# Patient Record
Sex: FEMALE | Race: WHITE | NOT HISPANIC OR LATINO | Employment: FULL TIME | URBAN - METROPOLITAN AREA
[De-identification: names, ages, dates, MRNs, and addresses within clinical notes are randomized per-mention and may not be internally consistent; named-entity substitution may affect disease eponyms.]

---

## 2017-01-28 ENCOUNTER — HOSPITAL ENCOUNTER (OUTPATIENT)
Dept: RADIOLOGY | Facility: CLINIC | Age: 22
Discharge: HOME | End: 2017-01-28
Admitting: EMERGENCY MEDICINE
Payer: COMMERCIAL

## 2017-01-28 ENCOUNTER — OFFICE VISIT (OUTPATIENT)
Dept: URGENT CARE | Facility: CLINIC | Age: 22
End: 2017-01-28
Payer: COMMERCIAL

## 2017-01-28 DIAGNOSIS — M25.572 PAIN IN LEFT ANKLE: ICD-10-CM

## 2017-01-28 PROCEDURE — 73610 X-RAY EXAM OF ANKLE: CPT

## 2017-01-28 PROCEDURE — 99214 OFFICE O/P EST MOD 30 MIN: CPT

## 2018-01-25 ENCOUNTER — OFFICE VISIT (OUTPATIENT)
Dept: URGENT CARE | Facility: CLINIC | Age: 23
End: 2018-01-25
Payer: COMMERCIAL

## 2018-01-25 VITALS
SYSTOLIC BLOOD PRESSURE: 102 MMHG | DIASTOLIC BLOOD PRESSURE: 70 MMHG | OXYGEN SATURATION: 99 % | TEMPERATURE: 98.6 F | HEIGHT: 66 IN | BODY MASS INDEX: 36.96 KG/M2 | WEIGHT: 230 LBS | RESPIRATION RATE: 18 BRPM | HEART RATE: 104 BPM

## 2018-01-25 DIAGNOSIS — R10.11 RIGHT UPPER QUADRANT PAIN: Primary | ICD-10-CM

## 2018-01-25 DIAGNOSIS — R19.7 DIARRHEA, UNSPECIFIED TYPE: ICD-10-CM

## 2018-01-25 PROCEDURE — 99213 OFFICE O/P EST LOW 20 MIN: CPT | Performed by: PHYSICIAN ASSISTANT

## 2018-01-25 NOTE — PROGRESS NOTES
Assessment/Plan:      Diagnoses and all orders for this visit:    Left upper quadrant pain    Diarrhea, unspecified type          Subjective:     Patient ID: Marleni Holguin is a 23 y o  female  66-year-old female here with complaint of nausea and vomiting that started 4 days ago  Has felt very nauseous over the last 4 days  Also has diarrhea for the last 4 days  Vomiting occurred on day 1 only  Patient is now having some right upper quadrant abdominal pain  Denies any association to any type of food  Is able to hold down only small sips of water at a time  Has not been able to eat anything over the last 4 days  Feels dizzy and lightheaded at times  He has felt feverish and has had chills  Review of Systems   Constitutional: Positive for chills and fever  Negative for activity change, appetite change, diaphoresis, fatigue and unexpected weight change  HENT: Negative for congestion, dental problem, hearing loss, sinus pressure, sneezing, sore throat, tinnitus, trouble swallowing and voice change  Eyes: Negative for photophobia, redness and visual disturbance  Respiratory: Negative for apnea, cough, chest tightness, shortness of breath, wheezing and stridor  Cardiovascular: Negative for chest pain, palpitations and leg swelling  Gastrointestinal: Positive for abdominal pain, diarrhea, nausea and vomiting  Negative for abdominal distention, blood in stool and constipation  Endocrine: Negative for cold intolerance, heat intolerance, polydipsia, polyphagia and polyuria  Genitourinary: Negative for difficulty urinating, dysuria, flank pain, frequency, hematuria and urgency  Musculoskeletal: Negative for arthralgias, back pain, gait problem, joint swelling, myalgias, neck pain and neck stiffness  Skin: Negative for pallor, rash and wound  Neurological: Negative for dizziness, tremors, seizures, speech difficulty, weakness and headaches  Hematological: Negative for adenopathy  Does not bruise/bleed easily  Psychiatric/Behavioral: Negative for agitation, confusion, dysphoric mood and sleep disturbance  The patient is not nervous/anxious  All other systems reviewed and are negative  Objective:     Physical Exam   Constitutional: She appears well-developed and well-nourished  No distress  HENT:   Head: Normocephalic  Right Ear: External ear normal    Left Ear: External ear normal    Nose: Nose normal    Mouth/Throat: Oropharynx is clear and moist  No oropharyngeal exudate  Neck: Normal range of motion  Neck supple  Cardiovascular: Normal rate, regular rhythm and normal heart sounds  No murmur heard  Pulmonary/Chest: Effort normal and breath sounds normal  No respiratory distress  She has no wheezes  She has no rales  Abdominal: Bowel sounds are increased  There is tenderness  There is positive Auguste's sign  There is no rigidity and no guarding  Musculoskeletal: Normal range of motion  Lymphadenopathy:     She has no cervical adenopathy  Skin: Skin is warm  No rash noted

## 2020-07-05 ENCOUNTER — HOSPITAL ENCOUNTER (EMERGENCY)
Facility: HOSPITAL | Age: 25
Discharge: HOME | End: 2020-07-05
Attending: EMERGENCY MEDICINE | Admitting: EMERGENCY MEDICINE
Payer: COMMERCIAL

## 2020-07-05 VITALS
SYSTOLIC BLOOD PRESSURE: 178 MMHG | RESPIRATION RATE: 20 BRPM | DIASTOLIC BLOOD PRESSURE: 110 MMHG | WEIGHT: 250 LBS | HEIGHT: 66 IN | HEART RATE: 91 BPM | BODY MASS INDEX: 40.18 KG/M2 | TEMPERATURE: 97.6 F | OXYGEN SATURATION: 97 %
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
## 2020-07-05 PROCEDURE — 99281 EMR DPT VST MAYX REQ PHY/QHP: CPT

## 2020-07-05 PROCEDURE — 99284 EMERGENCY DEPT VISIT MOD MDM: CPT | Performed by: PHYSICIAN ASSISTANT

## 2020-07-05 RX ORDER — DOXYCYCLINE HYCLATE 100 MG/1
100 CAPSULE ORAL 2 TIMES DAILY
Qty: 14 CAPSULE | Refills: 0 | Status: SHIPPED | OUTPATIENT
Start: 2020-07-05 | End: 2020-07-12

## 2020-07-05 NOTE — ED PROVIDER NOTES
History  Chief Complaint   Patient presents with   [place] Thursday night in her yard, took OTC allergy pill  Patient presents with a red, swollen right ankle  She states that while sitting outside on her porch on Thursday, she felt a bite on her ankle  She is unsure what bit her  She denies any mechanical injury to the ankle  She states that her ankle was itchy and swollen the following day, but didn't become red until today  She does admit to scratching at the affected pruritic area  She used ice and took one Allegra on Saturday which she states helped the swelling  She denies fevers, chills, sweats, nausea, vomiting, and diarrhea  No lower leg pain or swelling  No personal or family history of DVT/PE  No specific a DVT/PE risk factors  None       Past Medical History:   Diagnosis Date    Gestational diabetes     Hypothyroid     Psoriatic arthritis ([facility] [address] )        Past Surgical History:   Procedure Laterality Date    RHINOPLASTY         History reviewed  No pertinent family history  I have reviewed and agree with the history as documented  E-Cigarette/Vaping     E-Cigarette/Vaping Substances     Social History     Tobacco Use    Smoking status: Never Smoker    Smokeless tobacco: Never Used   Substance Use Topics    Alcohol use: Not on file    Drug use: Not on file       Review of Systems   Constitutional: Negative for chills, fatigue and fever  HENT: Negative for ear pain and sore throat  Eyes: Negative for pain  Respiratory: Negative for cough, shortness of breath and wheezing  Cardiovascular: Negative for chest pain, palpitations and leg swelling  Gastrointestinal: Negative for abdominal pain, constipation, diarrhea, nausea and vomiting  Endocrine: Negative for polyuria  Genitourinary: Negative for dysuria and pelvic pain  Musculoskeletal: Positive for joint swelling  Negative for myalgias, neck pain and neck stiffness  Skin: Negative for rash  Neurological: Negative for dizziness, syncope, light-headedness and headaches  All other systems reviewed and are negative  Physical Exam  Physical Exam   Constitutional: She is oriented to person, place, and time  She appears well-developed and well-nourished  HENT:   Head: Normocephalic and atraumatic  Mouth/Throat: No oropharyngeal exudate  Eyes: EOM are normal    Neck: Normal range of motion  Cardiovascular: Normal rate, regular rhythm and normal heart sounds  Pulmonary/Chest: Effort normal and breath sounds normal    Abdominal: Soft  Bowel sounds are normal  There is no tenderness  Musculoskeletal: Normal range of motion  Moderate nonpitting edema to right ankle  Minimal increased erythema with no significant increased warmth  Small abrasions overlying anterior lateral surface of ankle joint without fluctuance or significant tenderness  No obvious bite marks or rash   Neurological: She is alert and oriented to person, place, and time  Skin: Skin is warm  Capillary refill takes less than 2 seconds  Psychiatric: She has a normal mood and affect  Vital Signs  ED Triage Vitals [07/05/20 1621]   Temperature Pulse Respirations Blood Pressure SpO2   97 6 °F (36 4 °C) 91 20 (!) 178/110 97 %      Temp Source Heart Rate Source Patient Position - Orthostatic VS BP Location FiO2 (%)   Temporal -- Sitting Left arm --      Pain Score       5           Vitals:    07/05/20 1621   BP: (!) 178/110   Pulse: 91   Patient Position - Orthostatic VS: Sitting         Visual Acuity      ED Medications  Medications - No data to display    Diagnostic Studies  Results Reviewed     None                 No orders to display              Procedures  Procedures         ED Course       US AUDIT      Most Recent Value   Initial Alcohol Screen: US AUDIT-C    1  How often do you have a drink containing alcohol? 3 Filed at: 07/05/2020 0143   2   How many drinks containing alcohol do you have on a typical day you are drinking? 0 Filed at: 07/05/2020 1623   3a  Male UNDER 65: How often do you have five or more drinks on one occasion? 0 Filed at: 07/05/2020 1623   3b  FEMALE Any Age, or MALE 65+: How often do you have 4 or more drinks on one occassion? 0 Filed at: 07/05/2020 1623   Audit-C Score  3 Filed at: 07/05/2020 1623                  ANDREA/DAST-10      Most Recent Value   How many times in the past year have you    Used an illegal drug or used a prescription medication for non-medical reasons? Never Filed at: 07/05/2020 1623                                MDM  Number of Diagnoses or Management Options  Bug bite with infection, initial encounter:   Diagnosis management comments: Patient presented with a swelling to her right ankle after she believes she was bit by a bug  It did not appear obviously infected however it did get considerably swollen and uncomfortable for the patient  This is likely a localized soft tissue infection that is starting  Apply Ace wrap to the affected joint and recommended warm compresses  Prescribe doxycycline and utilize shared decision making with the patient to quit on possibly starting medication right away however it is my recommendation for her to only start taking it and if it does not improve or get significantly worse the last   Risks of doxycycline discussed with patient and she was understanding of plan  Her elevated blood pressure noted  Patient states she gets nervous in the ER  Recommended follow-up with family doctor  Disposition  Final diagnoses:   Bug bite with infection, initial encounter     Time reflects when diagnosis was documented in both MDM as applicable and the Disposition within this note     Time User Action Codes Description Comment    7/5/2020  5:10 PM Marley Banda Add Rola Pfeiffer  XXXA] Bug bite with infection, initial encounter       ED Disposition     ED Disposition Condition Date/Time Comment    Discharge Stable Sun Jul 5, 2020  5:09 PM Serafina Moritz [address] [facility] discharge to home/self care  Follow-up Information     Follow up With Specialties Details Why [address] [place],  Nephrology Schedule an appointment as soon as possible for a visit   [address] TENA Reed  [phone]            Discharge Medication List as of 7/5/2020  5:17 PM      START taking these medications    Details   doxycycline hyclate (VIBRAMYCIN) 100 mg capsule Take 1 capsule (100 mg total) by mouth 2 (two) times a day for 7 days, Starting Sun 7/5/2020, Until Sun 7/12/2020, Normal           No discharge procedures on file      PDMP Review     None          ED Provider  Electronically Signed by           Burgess Mai PA-C  07/05/20 0659

## 2020-07-05 NOTE — DISCHARGE INSTRUCTIONS
Apply warm compresses to affected area  Keep on ace wrap as needed for pain and swelling   Start taking doxycycline if condition does not improve and follow up with a family doctor or return to ER if you develop any fever or chills

## 2022-05-24 ENCOUNTER — OFFICE VISIT (OUTPATIENT)
Dept: FAMILY MEDICINE CLINIC | Facility: CLINIC | Age: 27
End: 2022-05-24
Payer: COMMERCIAL

## 2022-05-24 VITALS
TEMPERATURE: 98.7 F | OXYGEN SATURATION: 98 % | DIASTOLIC BLOOD PRESSURE: 84 MMHG | WEIGHT: 281 LBS | HEIGHT: 66 IN | BODY MASS INDEX: 45.16 KG/M2 | SYSTOLIC BLOOD PRESSURE: 126 MMHG | HEART RATE: 88 BPM

## 2022-05-24 DIAGNOSIS — Z76.89 ENCOUNTER TO ESTABLISH CARE: ICD-10-CM

## 2022-05-24 DIAGNOSIS — Z86.32 HISTORY OF GESTATIONAL DIABETES: ICD-10-CM

## 2022-05-24 DIAGNOSIS — Z00.00 ANNUAL PHYSICAL EXAM: Primary | ICD-10-CM

## 2022-05-24 DIAGNOSIS — E03.8 HYPOTHYROIDISM DUE TO HASHIMOTO'S THYROIDITIS: ICD-10-CM

## 2022-05-24 DIAGNOSIS — E06.3 HYPOTHYROIDISM DUE TO HASHIMOTO'S THYROIDITIS: ICD-10-CM

## 2022-05-24 DIAGNOSIS — Z87.59 HISTORY OF MISCARRIAGE: ICD-10-CM

## 2022-05-24 DIAGNOSIS — F41.9 ANXIETY: ICD-10-CM

## 2022-05-24 PROBLEM — Z82.79 FAMILY HISTORY OF COMPLEX CONGENITAL HEART DISEASE: Status: ACTIVE | Noted: 2019-08-12

## 2022-05-24 PROBLEM — Z82.49 FAMILY HISTORY OF COMPLEX CONGENITAL HEART DISEASE: Status: ACTIVE | Noted: 2019-08-12

## 2022-05-24 PROCEDURE — 99385 PREV VISIT NEW AGE 18-39: CPT | Performed by: FAMILY MEDICINE

## 2022-05-24 PROCEDURE — 99204 OFFICE O/P NEW MOD 45 MIN: CPT | Performed by: FAMILY MEDICINE

## 2022-05-24 PROCEDURE — 3008F BODY MASS INDEX DOCD: CPT | Performed by: FAMILY MEDICINE

## 2022-05-24 PROCEDURE — 3725F SCREEN DEPRESSION PERFORMED: CPT | Performed by: FAMILY MEDICINE

## 2022-05-24 PROCEDURE — 1036F TOBACCO NON-USER: CPT | Performed by: FAMILY MEDICINE

## 2022-05-24 RX ORDER — BUSPIRONE HYDROCHLORIDE 7.5 MG/1
7.5 TABLET ORAL 2 TIMES DAILY
Qty: 60 TABLET | Refills: 5 | Status: SHIPPED | OUTPATIENT
Start: 2022-05-24 | End: 2022-06-28 | Stop reason: SDUPTHER

## 2022-05-24 RX ORDER — LEVOTHYROXINE SODIUM 0.03 MG/1
25 TABLET ORAL
Qty: 90 TABLET | Refills: 0 | Status: SHIPPED | OUTPATIENT
Start: 2022-05-24 | End: 2022-06-28 | Stop reason: SDUPTHER

## 2022-05-24 NOTE — PROGRESS NOTES
ADULT ANNUAL [facility]    NAME: Annie Saldana  AGE: 32 y o  SEX: female  : 1995     DATE: 2022     Assessment and Plan:     Problem List Items Addressed This Visit        Endocrine    Hypothyroidism due to Hashimoto's thyroiditis    Relevant Medications    levothyroxine (Euthyrox) 25 mcg tablet    Other Relevant Orders    CBC and differential    TSH, 3rd generation with Free T4 reflex       Other    History of miscarriage    History of gestational diabetes    Relevant Orders    Lipid Panel with Direct LDL reflex    Basic metabolic panel    BMI 54 2-75 2, adult ([address] )    Relevant Orders    Ambulatory Referral to Weight Management    Anxiety    Relevant Medications    busPIRone (BUSPAR) 7 5 mg tablet      Other Visit Diagnoses     Annual physical exam    -  Primary    Encounter to establish care              Immunizations and preventive care screenings were discussed with patient today  Appropriate education was printed on patient's after visit summary  Counseling:  Alcohol/drug use: discussed moderation in alcohol intake, the recommendations for healthy alcohol use, and avoidance of illicit drug use  Dental Health: discussed importance of regular tooth brushing, flossing, and dental visits  Injury prevention: discussed safety/seat belts, safety helmets, smoke detectors, carbon dioxide detectors, and smoking near bedding or upholstery  Sexual health: discussed sexually transmitted diseases, partner selection, use of condoms, avoidance of unintended pregnancy, and contraceptive alternatives  · Exercise: the importance of regular exercise/physical activity was discussed  Recommend exercise 3-5 times per week for at least 30 minutes  BMI Counseling: Body mass index is 45 35 kg/m²   The BMI is above normal  Nutrition recommendations include decreasing portion sizes, encouraging healthy choices of fruits and vegetables, decreasing fast food intake, consuming healthier snacks, moderation in carbohydrate intake, increasing intake of lean protein and reducing intake of saturated and trans fat  Exercise recommendations include moderate physical activity 150 minutes/week and exercising 3-5 times per week  Rationale for BMI follow-up plan is due to patient being overweight or obese  Depression Screening and Follow-up Plan: Patient was screened for depression during today's encounter  They screened negative with a PHQ-2 score of 0  No follow-ups on file  Chief Complaint:     Chief Complaint   Patient presents with   [facility] Annual Exam     Patient mentions she had miscarriage last month  Has thyroid issues, would like to lose weight and mentions anxiety  History of Present Illness:     Adult Annual Physical   Patient here for a comprehensive physical exam  The patient reports  - see problem-focused  Diet and Physical Activity  · Diet/Nutrition: well balanced diet, frequent junk food and consuming 3-5 servings of fruits/vegetables daily  · Exercise: no formal exercise  Depression Screening  PHQ-2/9 Depression Screening    Little interest or pleasure in doing things: 0 - not at all  Feeling down, depressed, or hopeless: 0 - not at all  PHQ-2 Score: 0  PHQ-2 Interpretation: Negative depression screen       General Health  · Sleep: sleeps well  · Hearing: normal - bilateral   · Vision: most recent eye exam >1 year ago and wears contacts  · Dental: regular dental visits  /GYN Health  · Follows with GYN     Review of Systems:     Review of Systems  - see problem-focused      Past Medical History:     Past Medical History:   Diagnosis Date    Gestational diabetes     Hypothyroid     Psoriatic arthritis ([facility] [address] )       Past Surgical History:     Past Surgical History:   Procedure Laterality Date    RHINOPLASTY        Social History:     Social History     Socioeconomic History    Marital status: Single     Spouse name: None    Number of children: None    Years of education: None    Highest education level: None   Occupational History    None   Tobacco Use    Smoking status: Never Smoker    Smokeless tobacco: Never Used   Substance and Sexual Activity    Alcohol use: None    Drug use: None    Sexual activity: None   Other Topics Concern    None   Social History Narrative    None     Social Determinants of Health     Financial Resource Strain: Not on file   Food Insecurity: Not on file   Transportation Needs: Not on file   Physical Activity: Not on file   Stress: Not on file   Social Connections: Not on file   Intimate Partner Violence: Not on file   Housing Stability: Not on file      Family History:     No family history on file  Current Medications:     Current Outpatient Medications   Medication Sig Dispense Refill    busPIRone (BUSPAR) 7 5 mg tablet Take 1 tablet (7 5 mg total) by mouth in the morning and 1 tablet (7 5 mg total) in the evening  60 tablet 5    levothyroxine (Euthyrox) 25 mcg tablet Take 1 tablet (25 mcg total) by mouth daily in the early morning 90 tablet 0    Loratadine (CLARITIN PO) Take by mouth       No current facility-administered medications for this visit  Allergies:     No Known Allergies   Physical Exam:     /84   Pulse 88   Temp 98 7 °F (37 1 °C)   Ht 5' 6" (1 676 m)   Wt 127 kg (281 lb)   SpO2 98%   BMI 45 35 kg/m²     Physical Exam - see problem-focused       [facility]

## 2022-05-24 NOTE — PROGRESS NOTES
Feli Russell 1995 female MRN: [id number]  [address]    Visit to Establish Care: Family Medicine    Assessment/Plan     No problem-specific Assessment & Plan notes found for this encounter  Flaca Eid was seen today for establish care and annual exam     Diagnoses and all orders for this visit:    Annual physical exam    History of miscarriage    Hypothyroidism due to Hashimoto's thyroiditis  -     CBC and differential; Future  -     TSH, 3rd generation with Free T4 reflex; Future  -     levothyroxine (Euthyrox) 25 mcg tablet; Take 1 tablet (25 mcg total) by mouth daily in the early morning    History of gestational diabetes  -     Lipid Panel with Direct LDL reflex; Future  -     Basic metabolic panel; Future    BMI 45 0-49 9, adult [facility])  -     Ambulatory Referral to Weight Management; Future    Anxiety  -     busPIRone (BUSPAR) 7 5 mg tablet; Take 1 tablet (7 5 mg total) by mouth in the morning and 1 tablet (7 5 mg total) in the evening  Encounter to establish care        In addition to the above, the patient was counseled on general preventative health care subjects, including but not limited to:  - Nutrition, healthy weight, aerobic and weight-bearing exercise  - Mental health, social support, and self care  - Advised of the importance of dental hygiene and routine dental visits   - Patient made aware of  services at the office  SUBJECTIVE    HPI:  Feli Russell is a 32 y o  female who presented to establish care with this family medicine practice  Two prior pregnancies, first miscarriage in April, now had period  Follows with OBGYN  Has 3year old daughter  Thyroid disorder- Diagnosed in high school, on synthroid for a couple years, then in college dropped off  Recent thyroid ultrasound normal  Free T4 normal 0 76, fasting glucose normal 89, CMP normal, TSH 7 8  Blood work from April  History of gestational diabetes   She has struggled with obesity her whole life, weak points are carbs and sweet, usually lunch sandwich or pizza, dinner chicken and vegetables  Irregular work schedule  Not exercising currently  No history of anorexia or not eating  Binges sometimes, with irregular work schedule eats when she has time to eat  Anxiety- Past year or two, anxious about many things, worries about daughter and   No depression or loss of interest, worries about something happening to her family  Sometimes money concern  Good family support  No SI  No prior treatment  PHQ-2/9 Depression Screening    Little interest or pleasure in doing things: 0 - not at all  Feeling down, depressed, or hopeless: 0 - not at all  PHQ-2 Score: 0  PHQ-2 Interpretation: Negative depression screen         Family history of diabetes, breast cancer  Review of Systems   All other systems reviewed and are negative  Historical Information   Past Medical History:   Diagnosis Date    Gestational diabetes     Hypothyroid     [facility] ([facility] [address] )      Past Surgical History:   Procedure Laterality Date    RHINOPLASTY       No family history on file    Social History     Socioeconomic History    Marital status: Single     Spouse name: Not on file    Number of children: Not on file    Years of education: Not on file    Highest education level: Not on file   Occupational History    Not on file   Tobacco Use    Smoking status: Never Smoker    Smokeless tobacco: Never Used   Substance and Sexual Activity    Alcohol use: Not on file    Drug use: Not on file    Sexual activity: Not on file   Other Topics Concern    Not on file   Social History Narrative    Not on file     Social Determinants of Health     Financial Resource Strain: Not on file   Food Insecurity: Not on file   Transportation Needs: Not on file   Physical Activity: Not on file   Stress: Not on file   Social Connections: Not on file   Intimate Partner Violence: Not on file   Housing Stability: Not on file     OB History    No obstetric history on file  Medications:      Current Outpatient Medications:     busPIRone (BUSPAR) 7 5 mg tablet, Take 1 tablet (7 5 mg total) by mouth in the morning and 1 tablet (7 5 mg total) in the evening , Disp: 60 tablet, Rfl: 5    levothyroxine (Euthyrox) 25 mcg tablet, Take 1 tablet (25 mcg total) by mouth daily in the early morning, Disp: 90 tablet, Rfl: 0    Loratadine (CLARITIN PO), Take by mouth, Disp: , Rfl:       Physical Exam:    Physical Exam  Vitals reviewed  Constitutional:       General: She is not in acute distress  Appearance: Normal appearance  She is not ill-appearing, toxic-appearing or diaphoretic  HENT:      Head: Normocephalic and atraumatic  Eyes:      General:         Right eye: No discharge  Left eye: No discharge  Extraocular Movements: Extraocular movements intact  Conjunctiva/sclera: Conjunctivae normal    Cardiovascular:      Rate and Rhythm: Normal rate and regular rhythm  Heart sounds: Normal heart sounds  No murmur heard  No friction rub  No gallop  Pulmonary:      Effort: Pulmonary effort is normal  No respiratory distress  Breath sounds: Normal breath sounds  No stridor  No wheezing or rhonchi  Musculoskeletal:         General: No swelling, tenderness or signs of injury  Right lower leg: No edema  Left lower leg: No edema  Skin:     General: Skin is warm  Coloration: Skin is not pale  Findings: No erythema or rash  Neurological:      Mental Status: She is alert and oriented to person, place, and time  Motor: No weakness  Psychiatric:         Mood and Affect: Mood normal          Behavior: Behavior normal             No future appointments        Perry Cash DO  [address] Primary Care

## 2022-05-24 NOTE — PATIENT INSTRUCTIONS

## 2022-06-28 ENCOUNTER — TELEPHONE (OUTPATIENT)
Dept: ADMINISTRATIVE | Facility: OTHER | Age: 27
End: 2022-06-28

## 2022-06-28 ENCOUNTER — OFFICE VISIT (OUTPATIENT)
Dept: FAMILY MEDICINE CLINIC | Facility: CLINIC | Age: 27
End: 2022-06-28
Payer: COMMERCIAL

## 2022-06-28 VITALS
TEMPERATURE: 99.1 F | OXYGEN SATURATION: 99 % | WEIGHT: 285 LBS | RESPIRATION RATE: 18 BRPM | SYSTOLIC BLOOD PRESSURE: 122 MMHG | HEART RATE: 84 BPM | DIASTOLIC BLOOD PRESSURE: 78 MMHG | BODY MASS INDEX: 45.8 KG/M2 | HEIGHT: 66 IN

## 2022-06-28 DIAGNOSIS — E03.8 HYPOTHYROIDISM DUE TO HASHIMOTO'S THYROIDITIS: Primary | ICD-10-CM
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
## 2022-06-28 DIAGNOSIS — E06.3 HYPOTHYROIDISM DUE TO HASHIMOTO'S THYROIDITIS: Primary | ICD-10-CM

## 2022-06-28 DIAGNOSIS — F41.9 ANXIETY: ICD-10-CM

## 2022-06-28 PROCEDURE — 3725F SCREEN DEPRESSION PERFORMED: CPT | Performed by: FAMILY MEDICINE

## 2022-06-28 PROCEDURE — 3008F BODY MASS INDEX DOCD: CPT | Performed by: FAMILY MEDICINE

## 2022-06-28 PROCEDURE — 99214 OFFICE O/P EST MOD 30 MIN: CPT | Performed by: FAMILY MEDICINE

## 2022-06-28 PROCEDURE — 1036F TOBACCO NON-USER: CPT | Performed by: FAMILY MEDICINE

## 2022-06-28 RX ORDER — LEVOTHYROXINE SODIUM 0.05 MG/1
50 TABLET ORAL
Qty: 90 TABLET | Refills: 1 | Status: SHIPPED | OUTPATIENT
Start: 2022-06-28

## 2022-06-28 RX ORDER — BUSPIRONE HYDROCHLORIDE 10 MG/1
10 TABLET ORAL 2 TIMES DAILY
Qty: 60 TABLET | Refills: 1 | Status: SHIPPED | OUTPATIENT
Start: 2022-06-28

## 2022-06-28 NOTE — TELEPHONE ENCOUNTER
----- Message from Catia Sanchez sent at 6/28/2022  6:43 AM EDT -----  Regarding: Cervical Cancer  06/28/22 6:43 AM    Hello, our patient Liv Galloway has had Pap Smear (HPV) aka Cervical Cancer Screening completed/performed  Please assist in updating the patient chart by pulling the Care Everywhere (CE) document  The date of service is 7/29/19       Thank you,  Catia Patel

## 2022-06-28 NOTE — ASSESSMENT & PLAN NOTE
- No erythema or fevers, recommended to apply antibiotic cream twice daily and call if persistent/worsening, will call in antibiotic

## 2022-06-28 NOTE — ASSESSMENT & PLAN NOTE
- Increase levothyroxine 50 mcg daily, TSH in 6 weeks   - Advised will continue to monitor periods, hopefully should improve with thyroid medication dosage adjustment

## 2022-06-28 NOTE — PROGRESS NOTES
Assessment/Plan:       Problem List Items Addressed This Visit        Endocrine    Hypothyroidism due to Hashimoto's thyroiditis - Primary     - Increase levothyroxine 50 mcg daily, TSH in 6 weeks   - Advised will continue to monitor periods, hopefully should improve with thyroid medication dosage adjustment            Relevant Medications    levothyroxine 50 mcg tablet    Other Relevant Orders    TSH, 3rd generation       Other    BMI 45 0-49 9, adult (HCC)    Anxiety    Relevant Medications    busPIRone (BUSPAR) 10 mg tablet    Bug bite     - No erythema or fevers, recommended to apply antibiotic cream twice daily and call if persistent/worsening, will call in antibiotic                    Subjective:      Patient ID: Priyanka Barajas is a 32 y o  female  HPI     Recent blood work reviewed and normal, except for as outlined below:     Hypothyroidism- Free T4 normal, TSH 4 73, restarted levothyroxine 25 mcg daily 4 weeks ago  Feels fatigued, sluggish, no constipation or cold intolerance  Otherwise blood work normal      Anxiety- Last visit we started Buspar 7 5 mg BID  Gets worked up sometimes/anxiety flares, but overall better  Anxiety more manageable  No depression  Has gained a couple pounds with this, unsure if medication  Has follow-up with weight management in July  Period concern- LMP started 6/18, has now lasted much longer 10 days, usually period every 30 days, no history of irregular periods, no birth control  Follows with GYN  Bug bite on arm, weekend of 6/18, unsure what bite her  No fevers, comes to head/pus sometimes  Not healing  The following portions of the patient's history were reviewed and updated as appropriate: allergies, current medications, past family history, past medical history, past social history, past surgical history, and problem list     Review of Systems   All other systems reviewed and are negative          Objective:      /78   Pulse 84   Temp 99 1 °F (37 3 °C) (Tympanic)   Resp 18   Ht 5' 6" (1 676 m)   Wt 129 kg (285 lb)   SpO2 99%   BMI 46 00 kg/m²          Physical Exam  Vitals reviewed  Constitutional:       General: She is not in acute distress  Appearance: Normal appearance  She is not ill-appearing, toxic-appearing or diaphoretic  HENT:      Head: Normocephalic and atraumatic  Eyes:      General:         Right eye: No discharge  Left eye: No discharge  Extraocular Movements: Extraocular movements intact  Conjunctiva/sclera: Conjunctivae normal    Cardiovascular:      Rate and Rhythm: Normal rate and regular rhythm  Heart sounds: Normal heart sounds  No murmur heard  No friction rub  No gallop  Pulmonary:      Effort: Pulmonary effort is normal  No respiratory distress  Breath sounds: Normal breath sounds  No stridor  No wheezing or rhonchi  Musculoskeletal:         General: No swelling, tenderness or signs of injury  Right lower leg: No edema  Left lower leg: No edema  Skin:     General: Skin is warm  Coloration: Skin is not pale  Findings: No erythema or rash  Comments: 2-3 mm circular excoriation/bite latrice, no surrounding erythema, no discharge left forearm    Neurological:      Mental Status: She is alert and oriented to person, place, and time  Motor: No weakness     Psychiatric:         Mood and Affect: Mood normal          Behavior: Behavior normal              Dena Loss, DO  [facility]

## 2022-06-29 NOTE — TELEPHONE ENCOUNTER
Upon review of the In Basket request we were able to locate, review, and update the patient chart as requested for Pap Smear (HPV) aka Cervical Cancer Screening  Any additional questions or concerns should be emailed to the Practice Liaisons via [email or web address] email, please do not reply via In Basket      Thank you  Vibha Wells

## 2022-07-14 ENCOUNTER — OFFICE VISIT (OUTPATIENT)
Dept: BARIATRICS | Facility: CLINIC | Age: 27
End: 2022-07-14

## 2022-07-14 DIAGNOSIS — R63.5 ABNORMAL WEIGHT GAIN: ICD-10-CM

## 2022-07-14 PROCEDURE — WMDI30

## 2022-07-14 PROCEDURE — RECHECK

## 2022-07-14 NOTE — PROGRESS NOTES
Weight Management Medical Nutrition Assessment  Chang Green was here today for medical meal planning and was referred by her doctor  Today she weighs 285 lbs and has a goal weihgt of 160 - 170 lbs  She admits that she does not pay attention to portion sizes, and on days off, can have 6 - 8 cans of beer  Recommend cutting back on the beer,and increase her water  She had seen an RD before for gestational DM and is familiar with carb counting and logging  Reviewed my fitness pal, her carb needs, and portion sizes  Patient seen by Medical Provider in past 6 months:  yes  Requested to schedule appointment with Medical Provider: No      Anthropometric Measurements  Start Weight (#): 285 0  Current Weight (#): 285 0  TBW % Change from start weight:0%  Ideal Body Weight (#):130  Goal Weight (#): 160 - 170    Weight Loss History  Previous weight loss attempts: Commercial Programs (Weight Watchers, [employer], etc )  Nutrition Counseling with RD    Food and Nutrition Related History  Wake up: 6:30 - 7am   Bed Time:    Food Recall  Breakfast: eggs or whatever is htere    Snack:bar  Lunch:sandwich with salad  Snack:  Dinner: meat, starch, veg  Snack:grazes      Beverages: water and sugar free beverages, social drinking (beer (6-8 2 days per week)  Volume of beverage intake: 68 - 70    Weekends: Same  Cravings: sweets  Trouble area of day: night time    Frequency of Eating out: irregularly  Food restrictions:none  Cooking: self   Food Shopping: self    Physical Activity Intake  Activity:none  Frequency:infrequently  Physical limitations/barriers to exercise: none    Estimated Needs  Energy    [employer] Needs: BMR : 2034 1-2# loss weekly sedentary:  1441 - 1941             1-2# loss weekly lightly active: 1527 - 1927  Maintenance calories for sedentary activity level: 2441  Protein:67 - 84    (1 2-1 5g/kg IBW)  Fluid: 66    (35mL/kg IBW)    Nutrition Diagnosis  Yes;     Overweight/obesity  related to Excess energy intake as evidenced by  BMI more than normative standard for age and sex (obesity-grade III 36+)       Nutrition Intervention    Nutrition Prescription  Calories:1300 - 1500  Protein: 70 - 80  Fluid70      Nutrition Education:    Calorie controlled menu  Lean protein food choices  Healthy snack options  Food journaling tips      Nutrition Counseling:  Strategies: meal planning, portion sizes, healthy snack choices, hydration, fiber intake, protein intake, exercise, food journal      Monitoring and Evaluation:  Evaluation criteria:  Energy Intake  Meet protein needs  Maintain adequate hydration  Monitor weekly weight  Meal planning/preparation  Food journal   Decreased portions at mealtimes and snacks  Physical activity     Barriers to learning:none  Readiness to change: Action:  (Changing behavior)  Comprehension: good  Expected Compliance: good

## 2022-07-25 ENCOUNTER — PATIENT MESSAGE (OUTPATIENT)
Dept: FAMILY MEDICINE CLINIC | Facility: CLINIC | Age: 27
End: 2022-07-25

## 2022-08-01 ENCOUNTER — OFFICE VISIT (OUTPATIENT)
Dept: URGENT CARE | Facility: CLINIC | Age: 27
End: 2022-08-01
Payer: COMMERCIAL

## 2022-08-01 VITALS
DIASTOLIC BLOOD PRESSURE: 78 MMHG | TEMPERATURE: 98 F | RESPIRATION RATE: 18 BRPM | OXYGEN SATURATION: 99 % | BODY MASS INDEX: 45.64 KG/M2 | WEIGHT: 284 LBS | HEART RATE: 84 BPM | SYSTOLIC BLOOD PRESSURE: 124 MMHG | HEIGHT: 66 IN

## 2022-08-01 DIAGNOSIS — L30.9 DERMATITIS: Primary | ICD-10-CM

## 2022-08-01 PROCEDURE — 99213 OFFICE O/P EST LOW 20 MIN: CPT | Performed by: NURSE PRACTITIONER

## 2022-08-01 RX ORDER — TRIAMCINOLONE ACETONIDE 0.25 MG/G
CREAM TOPICAL 2 TIMES DAILY
Qty: 30 G | Refills: 0 | Status: SHIPPED | OUTPATIENT
Start: 2022-08-01

## 2022-08-01 NOTE — PROGRESS NOTES
[employer] [facility]        NAME: Arnel Martinez is a 32 y o  female  : 1995    MRN: [id number]  DATE: 2022  TIME: 12:45 PM    Assessment and Plan   Dermatitis [L30 9]  1  Dermatitis  triamcinolone (KENALOG) 0 025 % cream         Patient Instructions       Follow up with PCP in 3-5 days  Proceed to  ER if symptoms worsen  You have been prescribed a topical cream for the rash  Use very sparingly to the face and neck  You may take claritin daily for itching  Follow up with your PCP and OBGYN  Go to the ED if symptoms worsen        Chief Complaint     Chief Complaint   Patient presents with    Rash         History of Present Illness       This is a 32year old female who is 7 weeks pregnant and states developed a rash this morning  She denies any new meds, jewelry, foods, soaps, detergents  She states she was using hydrocortisone cream w/o relief  She states it is very itchy  She states it is on her face/cheeks and left neck  She has a hx of eczema but states it doesn't feel like it  She states she attempted to get in touch with her OB but was unable  Review of Systems   Review of Systems   Constitutional: Negative  HENT: Negative  Eyes: Negative  Respiratory: Negative  Cardiovascular: Negative  Gastrointestinal: Negative  Endocrine: Negative  Genitourinary: Negative  Musculoskeletal: Negative  Skin: Positive for rash  Allergic/Immunologic: Negative  Neurological: Negative  Hematological: Negative  Psychiatric/Behavioral: Negative            Current Medications       Current Outpatient Medications:     triamcinolone (KENALOG) 0 025 % cream, Apply topically 2 (two) times a day Apply sparingly, Disp: 30 g, Rfl: 0    busPIRone (BUSPAR) 10 mg tablet, Take 1 tablet (10 mg total) by mouth 2 (two) times a day, Disp: 60 tablet, Rfl: 1    levothyroxine 50 mcg tablet, Take 1 tablet (50 mcg total) by mouth daily in the early morning, Disp: 90 tablet, Rfl: 1    Loratadine (CLARITIN PO), Take by mouth, Disp: , Rfl:     Current Allergies     Allergies as of 08/01/2022    (No Known Allergies)            The following portions of the patient's history were reviewed and updated as appropriate: allergies, current medications, past family history, past medical history, past social history, past surgical history and problem list      Past Medical History:   Diagnosis Date    Gestational diabetes     Hypothyroid     Psoriatic arthritis ([address] )        Past Surgical History:   Procedure Laterality Date    RHINOPLASTY         History reviewed  No pertinent family history  Medications have been verified  Objective   /78   Pulse 84   Temp 98 °F (36 7 °C)   Resp 18   Ht 5' 6" (1 676 m)   Wt 129 kg (284 lb)   SpO2 99%   BMI 45 84 kg/m²   No LMP recorded  Physical Exam     Physical Exam  Vitals and nursing note reviewed  Constitutional:       General: She is not in acute distress  Appearance: Normal appearance  She is obese  She is not ill-appearing, toxic-appearing or diaphoretic  HENT:      Head: Normocephalic and atraumatic  Right Ear: Tympanic membrane and ear canal normal       Left Ear: Tympanic membrane and ear canal normal       Nose: Nose normal  No congestion or rhinorrhea  Mouth/Throat:      Mouth: Mucous membranes are moist       Pharynx: Oropharynx is clear  No oropharyngeal exudate or posterior oropharyngeal erythema  Eyes:      Extraocular Movements: Extraocular movements intact  Cardiovascular:      Rate and Rhythm: Normal rate and regular rhythm  Pulses: Normal pulses  Heart sounds: Normal heart sounds  Pulmonary:      Effort: Pulmonary effort is normal       Breath sounds: Normal breath sounds  Abdominal:      General: There is no distension  Palpations: Abdomen is soft  Tenderness: There is no abdominal tenderness  Musculoskeletal:         General: Normal range of motion  Cervical back: Normal range of motion and neck supple  Skin:     General: Skin is warm and dry  Capillary Refill: Capillary refill takes less than 2 seconds  Findings: Erythema and rash present  Neurological:      General: No focal deficit present  Mental Status: She is alert and oriented to person, place, and time  Psychiatric:         Mood and Affect: Mood normal          Behavior: Behavior normal          Thought Content:  Thought content normal          Judgment: Judgment normal

## 2022-08-01 NOTE — PATIENT INSTRUCTIONS
You have been prescribed a topical cream for the rash  Use very sparingly to the face and neck  You may take claritin daily for itching     Follow up with your PCP and OBGYN  Go to the ED if symptoms worsen

## 2022-08-06 ENCOUNTER — AMB VIDEO VISIT (OUTPATIENT)
Dept: OTHER | Facility: HOSPITAL | Age: 27
End: 2022-08-06

## 2022-08-06 DIAGNOSIS — L40.9 PSORIASIS: Primary | ICD-10-CM

## 2022-08-06 PROCEDURE — ECARE PR SL URGENT CARE VIRTUAL VISIT: Performed by: PHYSICIAN ASSISTANT

## 2022-08-06 NOTE — PATIENT INSTRUCTIONS
Psoriasis   WHAT YOU NEED TO KNOW:   Psoriasis is a long-term skin disease in which the skin cells grow faster than normal  This abnormal growth causes a buildup of cells on the surface of the skin  Red, raised patches of skin that are covered with silver-colored scales form on your skin  DISCHARGE INSTRUCTIONS:   Medicines:   Topical medicine: These medicines can be ointments, creams, and pastes and are applied on the skin  Moisturizers: These soothe your skin by keeping it moist and preventing dryness  Steroids: This medicine may be given to decrease inflammation  Vitamin D and retinoids: These are vitamin-based creams that are used to clear plaques  Anthralin:  This medicine decreases swelling and excess skin cells that form scales  Salicylic acid: This peeling agent helps decrease scaling of the skin and scalp  Tar preparations: These medicines decrease your itching, scaling, and inflammation  They may be shampoos, creams, or bath oils  Oral medicine: These medicines are used to treat serious types of psoriasis and are taken by mouth  These medicines include steroids or retinoids  They may also include medicines that decrease the rate of growth of your skin cells or that affect your immune system  Take your medicine as directed  Contact your healthcare provider if you think your medicine is not helping or if you have side effects  Tell him of her if you are allergic to any medicine  Keep a list of the medicines, vitamins, and herbs you take  Include the amounts, and when and why you take them  Bring the list or the pill bottles to follow-up visits  Carry your medicine list with you in case of an emergency  Follow up with your healthcare provider or dermatologist as directed:  Write down your questions so you remember to ask them during your visits  Self-care: Take care of your skin:  Apply emollients, lubricants, or moisturizing creams to your skin regularly   Apply these while your skin is still damp when you bathe  Stop using them if they sting or irritate your skin  Use mild soaps and add bath oils to soothe your skin when you bathe  Protect your skin from sun exposure:  When you get sun exposure for short periods of time, it can help your psoriasis  Too much sun exposure or a sunburn can make your psoriasis worse  Talk to your dermatologist or healthcare provider about how much sun exposure is right for you  Manage stress:  Stress can trigger a flare-up  Find healthy ways to manage stress, such as deep breathing or meditation  Watch for symptoms with new medicines or herbal supplements:  Some medicines, including herbal supplements, may trigger a psoriasis flare-up  Ask if any of the medicines you take may be making your psoriasis worse  Always check for skin changes when you take your medicines  Do not smoke:  Smoking can trigger a flare-up of psoriasis  If you smoke, it is never too late to quit  Ask for information about how to stop  Avoid triggers:  Injury to the skin, cold weather, and heavy alcohol use are other things that can trigger psoriasis flare-ups  Contact your healthcare provider if:   You get pregnant  You have a fever  Your skin plaques are not getting better or are getting worse  You cannot sleep because your skin itches  Your skin plaques have pus draining from them or they have soft yellow scabs  You have questions or concerns about your condition or care  Return to the emergency department if:   Psoriasis suddenly covers larger areas of your body and becomes more painful  © Copyright [employer] 2022 Information is for End User's use only and may not be sold, redistributed or otherwise used for commercial purposes  All illustrations and images included in CareNotes® are the copyrighted property of A D A M , Inc  or Stephane Reed  The above information is an  only   It is not intended as medical advice for individual conditions or treatments  Talk to your doctor, nurse or pharmacist before following any medical regimen to see if it is safe and effective for you

## 2022-08-06 NOTE — PROGRESS NOTES
Video Visit - Krista Uriarte 32 y o  female MRN: [id number]    REQUIRED DOCUMENTATION:         1  This service was provided via [employer]  2  Provider located at [address] [place] [phone]  3  [facility] provider: Teddy Carballo PA-C   4  Identify all parties in room with patient during [facility] visit:  No one else  5  After connecting through [email or web address], patient was identified by name and date of birth  Patient was then informed that this was a Telemedicine visit and that the exam was being conducted confidentially over secure lines  My office door was closed  No one else was in the room  Patient acknowledged consent and understanding of privacy and security of the Telemedicine visit  I informed the patient that I have reviewed their record in Epic and presented the opportunity for them to ask any questions regarding the visit today  The patient agreed to participate  HPI  Patient has a rash on her neck  She has psoriasis but it has blown up so bad  It is under her eyes and neck  She went to urgent care this week  She was given a steroid cream  She was worried about using steroids while pregnant  She states the rash is getting worse  She has not been able to see her OBGYN yet   Physical Exam  Constitutional:       General: She is not in acute distress  Appearance: Normal appearance  She is not ill-appearing, toxic-appearing or diaphoretic  HENT:      Head: Normocephalic and atraumatic  Pulmonary:      Effort: Pulmonary effort is normal  No respiratory distress  Skin:     General: Skin is dry  Findings: Rash present  Comments: Erythematous rash on neck and bilateral cheeks    Neurological:      General: No focal deficit present  Mental Status: She is alert     Psychiatric:         Mood and Affect: Mood normal          Behavior: Behavior normal          Diagnoses and all orders for this visit:    Psoriasis    start the topical kenalog cream as directed by urgent care  You may call you remy GARCIA to confirm they are ok with the cream if concerned  Follow up with RADHA Monday  ER if worsen   Patient Instructions   Psoriasis   WHAT YOU NEED TO KNOW:   Psoriasis is a long-term skin disease in which the skin cells grow faster than normal  This abnormal growth causes a buildup of cells on the surface of the skin  Red, raised patches of skin that are covered with silver-colored scales form on your skin  DISCHARGE INSTRUCTIONS:   Medicines:   1  Topical medicine: These medicines can be ointments, creams, and pastes and are applied on the skin  ? Moisturizers: These soothe your skin by keeping it moist and preventing dryness  ? Steroids: This medicine may be given to decrease inflammation  ? Vitamin D and retinoids: These are vitamin-based creams that are used to clear plaques  ? Anthralin:  This medicine decreases swelling and excess skin cells that form scales  ? Salicylic acid: This peeling agent helps decrease scaling of the skin and scalp  ? Tar preparations: These medicines decrease your itching, scaling, and inflammation  They may be shampoos, creams, or bath oils  2  Oral medicine: These medicines are used to treat serious types of psoriasis and are taken by mouth  These medicines include steroids or retinoids  They may also include medicines that decrease the rate of growth of your skin cells or that affect your immune system  3  Take your medicine as directed  Contact your healthcare provider if you think your medicine is not helping or if you have side effects  Tell him of her if you are allergic to any medicine  Keep a list of the medicines, vitamins, and herbs you take  Include the amounts, and when and why you take them  Bring the list or the pill bottles to follow-up visits  Carry your medicine list with you in case of an emergency      Follow up with your healthcare provider or dermatologist as directed:  Write down your questions so you remember to ask them during your visits  Self-care:   · Take care of your skin:  Apply emollients, lubricants, or moisturizing creams to your skin regularly  Apply these while your skin is still damp when you bathe  Stop using them if they sting or irritate your skin  Use mild soaps and add bath oils to soothe your skin when you bathe  · Protect your skin from sun exposure:  When you get sun exposure for short periods of time, it can help your psoriasis  Too much sun exposure or a sunburn can make your psoriasis worse  Talk to your dermatologist or healthcare provider about how much sun exposure is right for you  · Manage stress:  Stress can trigger a flare-up  Find healthy ways to manage stress, such as deep breathing or meditation  · Watch for symptoms with new medicines or herbal supplements:  Some medicines, including herbal supplements, may trigger a psoriasis flare-up  Ask if any of the medicines you take may be making your psoriasis worse  Always check for skin changes when you take your medicines  · Do not smoke:  Smoking can trigger a flare-up of psoriasis  If you smoke, it is never too late to quit  Ask for information about how to stop  · Avoid triggers:  Injury to the skin, cold weather, and heavy alcohol use are other things that can trigger psoriasis flare-ups  Contact your healthcare provider if:   · You get pregnant  · You have a fever  · Your skin plaques are not getting better or are getting worse  · You cannot sleep because your skin itches  · Your skin plaques have pus draining from them or they have soft yellow scabs  · You have questions or concerns about your condition or care  Return to the emergency department if:   · Psoriasis suddenly covers larger areas of your body and becomes more painful        © Copyright [employer] 2022 Information is for End User's use only and may not be sold, redistributed or otherwise used for commercial purposes  All illustrations and images included in CareNotes® are the copyrighted property of A D A M , Inc  or Stephane Reed  The above information is an  only  It is not intended as medical advice for individual conditions or treatments  Talk to your doctor, nurse or pharmacist before following any medical regimen to see if it is safe and effective for you

## 2022-08-06 NOTE — CARE ANYWHERE EVISITS
Visit Summary for Cj Caldwell - Gender: Female - Date of Birth: [id number]  Date: [id number] - Duration: 6 minutes  Patient: Cj Caldwell  Provider: wOen Landrum PA-C    Patient Contact Information  Address  [id number] [place] [address]  Diann LUDWGI; [address] [place] [address]  [phone]    Visit Topics  Major psoriasis outbreak during pregnancy  [Added By: Self - 2022-08-06]    Triage Questions   What is your current physical address in the event of a medical emergency? Answer []  Are you allergic to any medications? Answer []  Are you now or could you be pregnant? Answer []  Do you have any immune system compromise or chronic lung   disease? Answer []  Do you have any vulnerable family members in the home (infant, pregnant, cancer, elderly)? Answer []     Conversation Transcripts  [0A][0A] [Notification] You are connected with Owen Landrum PA-C, Urgent Care Specialist [0A][Notification] Praveena Casanova is located in [place]  [0A][Notification] Praveena Casanova has shared health history  Donavon Manifold  [0A]    Diagnosis  Psoriasis, unspecified    Procedures  Value: [id number] Code: CPT-4 UNLISTED E&M SERVICE    Medications Prescribed    No prescriptions ordered    Electronically signed by: Sravani Downing(NPI [id number])

## 2022-08-25 ENCOUNTER — RA CDI HCC (OUTPATIENT)
Dept: OTHER | Facility: HOSPITAL | Age: 27
End: 2022-08-25

## 2022-08-25 NOTE — PROGRESS NOTES
[facility]  coding opportunities          Chart Reviewed number of suggestions sent to Provider: 1   [phone]    Patients Insurance        Commercial Insurance: [employer] General

## 2022-08-30 ENCOUNTER — OFFICE VISIT (OUTPATIENT)
Dept: FAMILY MEDICINE CLINIC | Facility: CLINIC | Age: 27
End: 2022-08-30
Payer: COMMERCIAL

## 2022-08-30 VITALS
RESPIRATION RATE: 18 BRPM | SYSTOLIC BLOOD PRESSURE: 122 MMHG | OXYGEN SATURATION: 98 % | BODY MASS INDEX: 44.71 KG/M2 | DIASTOLIC BLOOD PRESSURE: 78 MMHG | WEIGHT: 277 LBS | TEMPERATURE: 97.5 F | HEART RATE: 89 BPM

## 2022-08-30 DIAGNOSIS — R01.1 SYSTOLIC MURMUR: ICD-10-CM

## 2022-08-30 DIAGNOSIS — E03.8 HYPOTHYROIDISM DUE TO HASHIMOTO'S THYROIDITIS: Primary | ICD-10-CM

## 2022-08-30 DIAGNOSIS — F41.9 ANXIETY: ICD-10-CM

## 2022-08-30 DIAGNOSIS — E06.3 HYPOTHYROIDISM DUE TO HASHIMOTO'S THYROIDITIS: Primary | ICD-10-CM

## 2022-08-30 PROCEDURE — 3725F SCREEN DEPRESSION PERFORMED: CPT | Performed by: FAMILY MEDICINE

## 2022-08-30 PROCEDURE — 99214 OFFICE O/P EST MOD 30 MIN: CPT | Performed by: FAMILY MEDICINE

## 2022-08-30 RX ORDER — HYDROXYZINE HYDROCHLORIDE 25 MG/1
TABLET, FILM COATED ORAL
COMMUNITY
Start: 2022-08-24

## 2022-08-30 RX ORDER — LEVOTHYROXINE SODIUM 0.07 MG/1
75 TABLET ORAL
Qty: 90 TABLET | Refills: 0 | Status: SHIPPED | OUTPATIENT
Start: 2022-08-30 | End: 2022-10-10

## 2022-08-30 NOTE — PROGRESS NOTES
Assessment/Plan:       Problem List Items Addressed This Visit        Endocrine    Hypothyroidism due to Hashimoto's thyroiditis - Primary     - Increase levothyroxine 75 mg daily   - TSH 4 weeks          Relevant Medications    levothyroxine (Euthyrox) 75 mcg tablet    Other Relevant Orders    TSH, 3rd generation with Free T4 reflex       Other    Anxiety     - Recommended counseling   - Can try half tablet Atarax 12 5 mg PRN         Systolic murmur     - Asymptomatic, likely physiologic murmur of pregnancy, reports was told she had murmur at one point in past   - Will assess further ECHO         Relevant Orders    Echo complete w/ contrast if indicated            Subjective:      Patient ID: Germán Barragan is a 32 y o  female  HPI     Hypothyroidism- Currently taking levothyroxine 50 mcg daily  TSH 8 82  Recently found out she is pregnant, 8 weeks  Feeling well, although fatigued especially in afternoon  Taking medication as prescribed  Anxiety- Stopped Buspar when she found out she was pregnant  Tried Atarax PRN which didn't work well, downiness  Not as frequent, but has her moments of stress/anxiety  Still anxious  No depression  Would not like to take daily medication, more as needed medication  The following portions of the patient's history were reviewed and updated as appropriate: allergies, current medications, past family history, past medical history, past social history, past surgical history, and problem list     Review of Systems   All other systems reviewed and are negative  Objective:      /78   Pulse 89   Temp 97 5 °F (36 4 °C)   Resp 18   Wt 126 kg (277 lb)   SpO2 98%   BMI 44 71 kg/m²          Physical Exam  Vitals reviewed  Constitutional:       General: She is not in acute distress  Appearance: Normal appearance  She is not ill-appearing, toxic-appearing or diaphoretic  HENT:      Head: Normocephalic and atraumatic     Eyes:      General:         Right eye: No discharge  Left eye: No discharge  Extraocular Movements: Extraocular movements intact  Conjunctiva/sclera: Conjunctivae normal    Cardiovascular:      Rate and Rhythm: Normal rate and regular rhythm  Heart sounds: Murmur heard  Systolic murmur is present with a grade of 1/6  No friction rub  No gallop  Pulmonary:      Effort: Pulmonary effort is normal  No respiratory distress  Breath sounds: Normal breath sounds  No stridor  No wheezing or rhonchi  Musculoskeletal:         General: No swelling, tenderness or signs of injury  Right lower leg: No edema  Left lower leg: No edema  Skin:     General: Skin is warm  Coloration: Skin is not pale  Findings: No erythema or rash  Neurological:      Mental Status: She is alert and oriented to person, place, and time  Motor: No weakness     Psychiatric:         Mood and Affect: Mood normal          Behavior: Behavior normal              DO Rafael Escobar [address] [facility]

## 2022-08-30 NOTE — ASSESSMENT & PLAN NOTE
- Asymptomatic, likely physiologic murmur of pregnancy, reports was told she had murmur at one point in past   - Will assess further ECHO no abrasions, no jaundice, no lesions, no pruritis, and no rashes.

## 2022-10-07 ENCOUNTER — HOSPITAL ENCOUNTER (OUTPATIENT)
Dept: NON INVASIVE DIAGNOSTICS | Facility: HOSPITAL | Age: 27
Discharge: HOME | End: 2022-10-07
Payer: COMMERCIAL

## 2022-10-07 VITALS
SYSTOLIC BLOOD PRESSURE: 122 MMHG | BODY MASS INDEX: 44.52 KG/M2 | HEART RATE: 82 BPM | DIASTOLIC BLOOD PRESSURE: 78 MMHG | HEIGHT: 66 IN | WEIGHT: 277 LBS

## 2022-10-07 DIAGNOSIS — R01.1 SYSTOLIC MURMUR: ICD-10-CM

## 2022-10-07 LAB
AORTIC ROOT: 3.2 CM
AORTIC VALVE MEAN VELOCITY: 10.6 M/S
APICAL FOUR CHAMBER EJECTION FRACTION: 68 %
ASCENDING AORTA: 2.9 CM
AV AREA BY CONTINUOUS VTI: 2.4 CM2
AV AREA PEAK VELOCITY: 2.5 CM2
AV LVOT MEAN GRADIENT: 2 MMHG
AV LVOT PEAK GRADIENT: 5 MMHG
AV MEAN GRADIENT: 5 MMHG
AV PEAK GRADIENT: 10 MMHG
AV VALVE AREA: 2.36 CM2
AV VELOCITY RATIO: 0.71
DOP CALC AO PEAK VEL: 1.61 M/S
DOP CALC AO VTI: 29.91 CM
DOP CALC LVOT AREA: 3.46 CM2
DOP CALC LVOT DIAMETER: 2.1 CM
DOP CALC LVOT PEAK VEL VTI: 20.42 CM
DOP CALC LVOT PEAK VEL: 1.14 M/S
DOP CALC LVOT STROKE INDEX: 31.3 ML/M2
DOP CALC LVOT STROKE VOLUME: 70.69
E WAVE DECELERATION TIME: 139 MS
FRACTIONAL SHORTENING: 33 (ref 28–44)
INTERVENTRICULAR SEPTUM IN DIASTOLE (PARASTERNAL SHORT AXIS VIEW): 1.1 CM
INTERVENTRICULAR SEPTUM: 1.1 CM (ref 0.6–1.1)
LAAS-AP2: 21.4 CM2
LAAS-AP4: 19.2 CM2
LEFT ATRIUM SIZE: 4.4 CM
LEFT INTERNAL DIMENSION IN SYSTOLE: 3 CM (ref 2.1–4)
LEFT VENTRICULAR INTERNAL DIMENSION IN DIASTOLE: 4.5 CM (ref 3.5–6)
LEFT VENTRICULAR POSTERIOR WALL IN END DIASTOLE: 1 CM
LEFT VENTRICULAR STROKE VOLUME: 61 ML
LVSV (TEICH): 61 ML
MV E'TISSUE VEL-SEP: 11 CM/S
MV PEAK A VEL: 0.58 M/S
MV PEAK E VEL: 89 CM/S
MV STENOSIS PRESSURE HALF TIME: 40 MS
MV VALVE AREA P 1/2 METHOD: 5.5
RIGHT ATRIUM AREA SYSTOLE A4C: 14.4 CM2
RIGHT VENTRICLE ID DIMENSION: 3.8 CM
SL CV LEFT ATRIUM LENGTH A2C: 5.4 CM
SL CV LV EF: 65
SL CV PED ECHO LEFT VENTRICLE DIASTOLIC VOLUME (MOD BIPLANE) 2D: 94 ML
SL CV PED ECHO LEFT VENTRICLE SYSTOLIC VOLUME (MOD BIPLANE) 2D: 34 ML

## 2022-10-07 PROCEDURE — 93306 TTE W/DOPPLER COMPLETE: CPT

## 2022-10-07 PROCEDURE — 93306 TTE W/DOPPLER COMPLETE: CPT | Performed by: INTERNAL MEDICINE

## 2023-01-06 ENCOUNTER — OFFICE VISIT (OUTPATIENT)
Dept: FAMILY MEDICINE CLINIC | Facility: CLINIC | Age: 28
End: 2023-01-06

## 2023-01-06 VITALS
BODY MASS INDEX: 43.46 KG/M2 | WEIGHT: 270.4 LBS | OXYGEN SATURATION: 99 % | RESPIRATION RATE: 18 BRPM | HEART RATE: 110 BPM | TEMPERATURE: 98.4 F | HEIGHT: 66 IN | SYSTOLIC BLOOD PRESSURE: 120 MMHG | DIASTOLIC BLOOD PRESSURE: 78 MMHG

## 2023-01-06 DIAGNOSIS — R09.81 NASAL CONGESTION: ICD-10-CM

## 2023-01-06 DIAGNOSIS — H66.90 ACUTE OTITIS MEDIA, UNSPECIFIED OTITIS MEDIA TYPE: Primary | ICD-10-CM

## 2023-01-06 PROBLEM — D68.61 ANTIPHOSPHOLIPID ANTIBODY SYNDROME (HCC): Status: ACTIVE | Noted: 2023-01-06

## 2023-01-06 RX ORDER — AMOXICILLIN 500 MG/1
1000 TABLET, FILM COATED ORAL EVERY 8 HOURS
Qty: 42 TABLET | Refills: 0 | Status: SHIPPED | OUTPATIENT
Start: 2023-01-06 | End: 2023-01-13

## 2023-01-06 RX ORDER — FLUTICASONE PROPIONATE 50 MCG
1 SPRAY, SUSPENSION (ML) NASAL DAILY
Qty: 16 G | Refills: 1 | Status: SHIPPED | OUTPATIENT
Start: 2023-01-06

## 2023-01-06 RX ORDER — LEVOTHYROXINE SODIUM 0.1 MG/1
100 TABLET ORAL DAILY
COMMUNITY
Start: 2022-12-22

## 2023-01-06 RX ORDER — HYDROGEN PEROXIDE 2.65 ML/100ML
162 LIQUID ORAL; TOPICAL
COMMUNITY
Start: 2023-01-05

## 2023-01-06 NOTE — PROGRESS NOTES
Assessment/Plan:       Problem List Items Addressed This Visit    None  Visit Diagnoses     Acute otitis media, unspecified otitis media type    -  Primary    Relevant Medications    amoxicillin (AMOXIL) 500 MG tablet    Nasal congestion        Relevant Medications    fluticasone (FLONASE) 50 mcg/act nasal spray            Exam consistent with otitis media, start amoxicillin 1,000 mg TID  Flonase for nasal congestion relief  Advised to monitor heart rate and SOB very closely, low threshold for work-up given APL syndrome  Subjective:      Patient ID: Jv Loja is a 32 y o  female  HPI     1 week ago started with scratchy throat, nasal congestion, worsening congestion with pressure  Daughter same symptoms  Ears painful/pressure  Tested for COVID/Flu negative  Coughing, productive cough  Ears popping, getting dizziness, ringing in ears  Painful breathing and shortness of breath, worse with sickness  No wheezing  Pain with coughing, not otherwise, no pain/pressure walking  No fevers  Just taking Tylenol  Feeling worse  27 weeks pregnant, discovered antiphospholipid antibody syndrome, follows [facility]  On aspirin 162 mg daily  The following portions of the patient's history were reviewed and updated as appropriate: allergies, current medications, past family history, past medical history, past social history, past surgical history, and problem list     Review of Systems   All other systems reviewed and are negative  Objective:      /78   Pulse (!) 110   Temp 98 4 °F (36 9 °C) (Tympanic)   Resp 18   Ht 5' 6" (1 676 m)   Wt 123 kg (270 lb 6 4 oz)   SpO2 99%   BMI 43 64 kg/m²          Physical Exam  Vitals reviewed  Constitutional:       General: She is not in acute distress  Appearance: Normal appearance  She is not ill-appearing, toxic-appearing or diaphoretic  HENT:      Head: Normocephalic and atraumatic  Right Ear: A middle ear effusion is present   Tympanic membrane is erythematous and bulging  Left Ear: A middle ear effusion is present  Tympanic membrane is erythematous and bulging  Nose: Congestion and rhinorrhea present  Mouth/Throat:      Mouth: Mucous membranes are moist       Pharynx: No oropharyngeal exudate or posterior oropharyngeal erythema  Eyes:      General:         Right eye: No discharge  Left eye: No discharge  Extraocular Movements: Extraocular movements intact  Conjunctiva/sclera: Conjunctivae normal    Cardiovascular:      Rate and Rhythm: Regular rhythm  Tachycardia present  Heart sounds: Normal heart sounds  No murmur heard  No friction rub  No gallop  Pulmonary:      Effort: Pulmonary effort is normal  No respiratory distress  Breath sounds: Normal breath sounds  No stridor  No wheezing or rhonchi  Musculoskeletal:         General: No swelling, tenderness or signs of injury  Skin:     General: Skin is warm  Coloration: Skin is not pale  Findings: No erythema or rash  Neurological:      Mental Status: She is alert and oriented to person, place, and time  Motor: No weakness     Psychiatric:         Mood and Affect: Mood normal          Behavior: Behavior normal              DO Rafael Costa [address] [place] [address] [facility]

## 2023-01-16 ENCOUNTER — TELEPHONE (OUTPATIENT)
Dept: FAMILY MEDICINE CLINIC | Facility: CLINIC | Age: 28
End: 2023-01-16

## 2023-01-16 NOTE — TELEPHONE ENCOUNTER
PT called today  She finished her antibiotic she was given  States she is still having sinus pressure, sore throat, cough, and burning in her ears  Wondering what to do from here?

## 2024-01-31 ENCOUNTER — OFFICE VISIT (OUTPATIENT)
Dept: FAMILY MEDICINE CLINIC | Facility: CLINIC | Age: 29
End: 2024-01-31
Payer: COMMERCIAL

## 2024-01-31 VITALS
BODY MASS INDEX: 44.52 KG/M2 | SYSTOLIC BLOOD PRESSURE: 128 MMHG | HEART RATE: 74 BPM | DIASTOLIC BLOOD PRESSURE: 84 MMHG | HEIGHT: 66 IN | RESPIRATION RATE: 18 BRPM | OXYGEN SATURATION: 98 % | TEMPERATURE: 97.7 F | WEIGHT: 277 LBS

## 2024-01-31 DIAGNOSIS — E06.3 HYPOTHYROIDISM DUE TO HASHIMOTO'S THYROIDITIS: Primary | ICD-10-CM

## 2024-01-31 DIAGNOSIS — Z13.220 ENCOUNTER FOR SCREENING FOR LIPID DISORDER: ICD-10-CM

## 2024-01-31 DIAGNOSIS — Z11.59 ENCOUNTER FOR HEPATITIS C SCREENING TEST FOR LOW RISK PATIENT: ICD-10-CM

## 2024-01-31 DIAGNOSIS — R53.83 FATIGUE, UNSPECIFIED TYPE: ICD-10-CM

## 2024-01-31 DIAGNOSIS — E03.8 HYPOTHYROIDISM DUE TO HASHIMOTO'S THYROIDITIS: Primary | ICD-10-CM

## 2024-01-31 DIAGNOSIS — R63.2 BINGE EATING: ICD-10-CM

## 2024-01-31 PROCEDURE — 3725F SCREEN DEPRESSION PERFORMED: CPT | Performed by: FAMILY MEDICINE

## 2024-01-31 PROCEDURE — 99214 OFFICE O/P EST MOD 30 MIN: CPT | Performed by: FAMILY MEDICINE

## 2024-01-31 RX ORDER — LEVOTHYROXINE SODIUM 0.05 MG/1
50 TABLET ORAL DAILY
Qty: 90 TABLET | Refills: 1 | Status: SHIPPED | OUTPATIENT
Start: 2024-01-31

## 2024-01-31 NOTE — PROGRESS NOTES
"Assessment/Plan:       Problem List Items Addressed This Visit          Endocrine    Hypothyroidism due to Hashimoto's thyroiditis - Primary    Relevant Medications    levothyroxine (Euthyrox) 50 mcg tablet    Other Relevant Orders    CBC and differential    Comprehensive metabolic panel    Lipid Panel with Direct LDL reflex    TSH, 3rd generation with Free T4 reflex    Iron Panel (Includes Ferritin, Iron Sat%, Iron, and TIBC)    Hepatitis C antibody     Other Visit Diagnoses       Fatigue, unspecified type        Relevant Orders    CBC and differential    Comprehensive metabolic panel    Iron Panel (Includes Ferritin, Iron Sat%, Iron, and TIBC)    Binge eating        Encounter for screening for lipid disorder        Relevant Orders    Lipid Panel with Direct LDL reflex    Encounter for hepatitis C screening test for low risk patient        Relevant Orders    Hepatitis C antibody              Start levothyroxine 50 mcg daily. Blood work today, and then will order TSH for 6 weeks from now.   Recommended to increase protein/fiber during the day to lessen hunger at night.   Follow-up 6 weeks.     Subjective:      Patient ID: Itzel Caldwell is a 28 y.o. female.    HPI    Hypothyroidism- Stopped levothyroxine 10 months ago after daughter. Fatigued all the time, gained weight back, cold all the time, dry skin, hair loss, constipation, a little feeling down/depressed. Binge eating, mindless eating since childhood.     The following portions of the patient's history were reviewed and updated as appropriate: allergies, current medications, past family history, past medical history, past social history, past surgical history, and problem list.    Review of Systems   All other systems reviewed and are negative.        Objective:      /84   Pulse 74   Temp 97.7 °F (36.5 °C) (Tympanic)   Resp 18   Ht 5' 6\" (1.676 m)   Wt 126 kg (277 lb)   SpO2 98%   BMI 44.71 kg/m²          Physical Exam  Vitals reviewed. "   Constitutional:       General: She is not in acute distress.     Appearance: Normal appearance. She is not ill-appearing, toxic-appearing or diaphoretic.   HENT:      Head: Normocephalic and atraumatic.   Eyes:      General:         Right eye: No discharge.         Left eye: No discharge.      Extraocular Movements: Extraocular movements intact.      Conjunctiva/sclera: Conjunctivae normal.   Neck:      Comments: Thyromegaly   Cardiovascular:      Rate and Rhythm: Normal rate and regular rhythm.      Heart sounds: Murmur heard.      No friction rub. No gallop.   Pulmonary:      Effort: Pulmonary effort is normal. No respiratory distress.      Breath sounds: Normal breath sounds. No stridor. No wheezing or rhonchi.   Musculoskeletal:         General: No swelling, tenderness or signs of injury.      Right lower leg: No edema.      Left lower leg: No edema.   Skin:     General: Skin is warm.      Coloration: Skin is not pale.      Findings: No erythema or rash.   Neurological:      Mental Status: She is alert and oriented to person, place, and time.      Motor: No weakness.   Psychiatric:         Mood and Affect: Mood normal.         Behavior: Behavior normal.             Juliette Bernard,   [facility]      No significant past surgical history

## 2024-02-15 DIAGNOSIS — E03.8 HYPOTHYROIDISM DUE TO HASHIMOTO'S THYROIDITIS: Primary | ICD-10-CM

## 2024-02-15 DIAGNOSIS — E06.3 HYPOTHYROIDISM DUE TO HASHIMOTO'S THYROIDITIS: Primary | ICD-10-CM

## 2024-04-16 ENCOUNTER — APPOINTMENT (OUTPATIENT)
Age: 29
End: 2024-04-16
Payer: COMMERCIAL

## 2024-04-16 DIAGNOSIS — E03.8 HYPOTHYROIDISM DUE TO HASHIMOTO'S THYROIDITIS: ICD-10-CM

## 2024-04-16 DIAGNOSIS — R53.83 FATIGUE, UNSPECIFIED TYPE: ICD-10-CM

## 2024-04-16 DIAGNOSIS — Z11.59 ENCOUNTER FOR HEPATITIS C SCREENING TEST FOR LOW RISK PATIENT: ICD-10-CM

## 2024-04-16 DIAGNOSIS — Z13.220 ENCOUNTER FOR SCREENING FOR LIPID DISORDER: ICD-10-CM

## 2024-04-16 DIAGNOSIS — E06.3 HYPOTHYROIDISM DUE TO HASHIMOTO'S THYROIDITIS: ICD-10-CM

## 2024-04-16 LAB
ALBUMIN SERPL BCP-MCNC: 4.2 G/DL (ref 3.5–5)
ALP SERPL-CCNC: 65 U/L (ref 34–104)
ALT SERPL W P-5'-P-CCNC: 18 U/L (ref 7–52)
ANION GAP SERPL CALCULATED.3IONS-SCNC: 9 MMOL/L (ref 4–13)
AST SERPL W P-5'-P-CCNC: 17 U/L (ref 13–39)
BASOPHILS # BLD AUTO: 0.03 THOUSANDS/ÂΜL (ref 0–0.1)
BASOPHILS NFR BLD AUTO: 0 % (ref 0–1)
BILIRUB SERPL-MCNC: 0.6 MG/DL (ref 0.2–1)
BUN SERPL-MCNC: 13 MG/DL (ref 5–25)
CALCIUM SERPL-MCNC: 9.3 MG/DL (ref 8.4–10.2)
CHLORIDE SERPL-SCNC: 103 MMOL/L (ref 96–108)
CHOLEST SERPL-MCNC: 144 MG/DL
CO2 SERPL-SCNC: 25 MMOL/L (ref 21–32)
CREAT SERPL-MCNC: 0.49 MG/DL (ref 0.6–1.3)
EOSINOPHIL # BLD AUTO: 0.21 THOUSAND/ÂΜL (ref 0–0.61)
EOSINOPHIL NFR BLD AUTO: 3 % (ref 0–6)
ERYTHROCYTE [DISTWIDTH] IN BLOOD BY AUTOMATED COUNT: 12.2 % (ref 11.6–15.1)
FERRITIN SERPL-MCNC: 30 NG/ML (ref 11–307)
GFR SERPL CREATININE-BSD FRML MDRD: 130 ML/MIN/1.73SQ M
GLUCOSE P FAST SERPL-MCNC: 85 MG/DL (ref 65–99)
HCT VFR BLD AUTO: 39.9 % (ref 34.8–46.1)
HCV AB SER QL: NORMAL
HDLC SERPL-MCNC: 38 MG/DL
HGB BLD-MCNC: 12.8 G/DL (ref 11.5–15.4)
IMM GRANULOCYTES # BLD AUTO: 0.05 THOUSAND/UL (ref 0–0.2)
IMM GRANULOCYTES NFR BLD AUTO: 1 % (ref 0–2)
IRON SATN MFR SERPL: 25 % (ref 15–50)
IRON SERPL-MCNC: 88 UG/DL (ref 50–212)
LDLC SERPL CALC-MCNC: 88 MG/DL (ref 0–100)
LYMPHOCYTES # BLD AUTO: 1.78 THOUSANDS/ÂΜL (ref 0.6–4.47)
LYMPHOCYTES NFR BLD AUTO: 26 % (ref 14–44)
MCH RBC QN AUTO: 28.8 PG (ref 26.8–34.3)
MCHC RBC AUTO-ENTMCNC: 32.1 G/DL (ref 31.4–37.4)
MCV RBC AUTO: 90 FL (ref 82–98)
MONOCYTES # BLD AUTO: 0.41 THOUSAND/ÂΜL (ref 0.17–1.22)
MONOCYTES NFR BLD AUTO: 6 % (ref 4–12)
NEUTROPHILS # BLD AUTO: 4.47 THOUSANDS/ÂΜL (ref 1.85–7.62)
NEUTS SEG NFR BLD AUTO: 64 % (ref 43–75)
NRBC BLD AUTO-RTO: 0 /100 WBCS
PLATELET # BLD AUTO: 253 THOUSANDS/UL (ref 149–390)
PMV BLD AUTO: 11.3 FL (ref 8.9–12.7)
POTASSIUM SERPL-SCNC: 4.1 MMOL/L (ref 3.5–5.3)
PROT SERPL-MCNC: 7.5 G/DL (ref 6.4–8.4)
RBC # BLD AUTO: 4.45 MILLION/UL (ref 3.81–5.12)
SODIUM SERPL-SCNC: 137 MMOL/L (ref 135–147)
T4 FREE SERPL-MCNC: 0.64 NG/DL (ref 0.61–1.12)
TIBC SERPL-MCNC: 350 UG/DL (ref 250–450)
TRIGL SERPL-MCNC: 90 MG/DL
TSH SERPL DL<=0.05 MIU/L-ACNC: 8.13 UIU/ML (ref 0.45–4.5)
UIBC SERPL-MCNC: 262 UG/DL (ref 155–355)
WBC # BLD AUTO: 6.95 THOUSAND/UL (ref 4.31–10.16)

## 2024-04-16 PROCEDURE — 83540 ASSAY OF IRON: CPT

## 2024-04-16 PROCEDURE — 80061 LIPID PANEL: CPT

## 2024-04-16 PROCEDURE — 83550 IRON BINDING TEST: CPT

## 2024-04-16 PROCEDURE — 36415 COLL VENOUS BLD VENIPUNCTURE: CPT

## 2024-04-16 PROCEDURE — 84443 ASSAY THYROID STIM HORMONE: CPT

## 2024-04-16 PROCEDURE — 84439 ASSAY OF FREE THYROXINE: CPT

## 2024-04-16 PROCEDURE — 86803 HEPATITIS C AB TEST: CPT

## 2024-04-16 PROCEDURE — 85025 COMPLETE CBC W/AUTO DIFF WBC: CPT

## 2024-04-16 PROCEDURE — 82728 ASSAY OF FERRITIN: CPT

## 2024-04-16 PROCEDURE — 80053 COMPREHEN METABOLIC PANEL: CPT

## 2024-05-30 DIAGNOSIS — E03.8 HYPOTHYROIDISM DUE TO HASHIMOTO'S THYROIDITIS: ICD-10-CM

## 2024-05-30 DIAGNOSIS — E06.3 HYPOTHYROIDISM DUE TO HASHIMOTO'S THYROIDITIS: ICD-10-CM

## 2024-05-30 RX ORDER — LEVOTHYROXINE SODIUM 0.07 MG/1
75 TABLET ORAL DAILY
Qty: 90 TABLET | Refills: 1 | Status: SHIPPED | OUTPATIENT
Start: 2024-05-30

## 2024-08-13 ENCOUNTER — HOSPITAL ENCOUNTER (EMERGENCY)
Facility: HOSPITAL | Age: 29
Discharge: HOME | End: 2024-08-14
Attending: EMERGENCY MEDICINE
Payer: COMMERCIAL

## 2024-08-13 DIAGNOSIS — R07.89 ATYPICAL CHEST PAIN: Primary | ICD-10-CM

## 2024-08-13 PROCEDURE — 99285 EMERGENCY DEPT VISIT HI MDM: CPT

## 2024-08-13 PROCEDURE — 99284 EMERGENCY DEPT VISIT MOD MDM: CPT | Performed by: EMERGENCY MEDICINE

## 2024-08-14 ENCOUNTER — APPOINTMENT (EMERGENCY)
Dept: RADIOLOGY | Facility: HOSPITAL | Age: 29
End: 2024-08-14
Payer: COMMERCIAL

## 2024-08-14 VITALS
TEMPERATURE: 98.9 F | DIASTOLIC BLOOD PRESSURE: 70 MMHG | HEIGHT: 66 IN | OXYGEN SATURATION: 95 % | BODY MASS INDEX: 44.2 KG/M2 | HEART RATE: 76 BPM | SYSTOLIC BLOOD PRESSURE: 126 MMHG | RESPIRATION RATE: 15 BRPM | WEIGHT: 275 LBS

## 2024-08-14 LAB
ATRIAL RATE: 83 BPM
P AXIS: 48 DEGREES
PR INTERVAL: 158 MS
QRS AXIS: 85 DEGREES
QRSD INTERVAL: 98 MS
QT INTERVAL: 380 MS
QTC INTERVAL: 446 MS
T WAVE AXIS: 38 DEGREES
VENTRICULAR RATE: 83 BPM

## 2024-08-14 PROCEDURE — 93005 ELECTROCARDIOGRAM TRACING: CPT

## 2024-08-14 PROCEDURE — 71045 X-RAY EXAM CHEST 1 VIEW: CPT

## 2024-08-14 PROCEDURE — 93010 ELECTROCARDIOGRAM REPORT: CPT | Performed by: INTERNAL MEDICINE

## 2024-08-14 NOTE — ED PROVIDER NOTES
History  Chief Complaint   Patient presents with    Chest Pain     Around 2230 started with a sharp chest pain that lasted less than a minute; she then had a panic attack due to this pain; pt states she feels like it is more of her left breast     HPI    29-year-old female with a history of gestational diabetes, psoriatic arthritis, autoimmune lymph oh prolific syndrome, presents the emergency department with chest pain that started at 1030 this evening, left-sided chest pain.  Chest pain lasted less than a minute, after the onset of chest pain patient reports history of mild anxiety and went to the bathroom had a bowel movement.  Bowel movement was nonbloody, no history of melena, no history hematochezia.  No abdominal pain, no back pain.  Patient is currently day 2 of her menses which is heavier than normal, usually last for approximately 4 days.    No trauma history, no recent travel outside the geographical area, no fever, no chills, no B symptoms, no night sweats.    Remaining 12 point review of systems is unremarkable.    Patient has 2 children 1 is 4-1/2 the other is 16 months, no complications related to her prior pregnancies.  Prior to Admission Medications   Prescriptions Last Dose Informant Patient Reported? Taking?   levothyroxine (Euthyrox) 75 mcg tablet   No No   Sig: Take 1 tablet (75 mcg total) by mouth daily      Facility-Administered Medications: None       Past Medical History:   Diagnosis Date    ALPS (autoimmune lymphoproliferative syndrome) (HCC)     Gestational diabetes     Hypothyroid     Psoriatic arthritis (HCC)        Past Surgical History:   Procedure Laterality Date    RHINOPLASTY         History reviewed. No pertinent family history.  I have reviewed and agree with the history as documented.    E-Cigarette/Vaping    E-Cigarette Use Never User      E-Cigarette/Vaping Substances    Nicotine No     THC No     CBD No     Flavoring No     Other No     Unknown No      Social History      Tobacco Use    Smoking status: Never    Smokeless tobacco: Never   Vaping Use    Vaping status: Never Used   Substance Use Topics    Alcohol use: Yes     Comment: weekend drinking    Drug use: Never       Review of Systems   Constitutional: Negative.  Negative for chills and fever.   HENT: Negative.     Eyes: Negative.    Respiratory: Negative.  Negative for choking, chest tightness and shortness of breath.    Cardiovascular:  Positive for chest pain. Negative for palpitations and leg swelling.   Gastrointestinal: Negative.  Negative for abdominal pain and nausea.   Endocrine: Negative.    Genitourinary: Negative.  Negative for difficulty urinating, dysuria, flank pain and hematuria.   Musculoskeletal: Negative.  Negative for back pain and neck pain.   Skin: Negative.    Allergic/Immunologic: Negative.    Neurological: Negative.    Hematological: Negative.    Psychiatric/Behavioral: Negative.         Physical Exam  Physical Exam  Vitals and nursing note reviewed.   Constitutional:       General: She is not in acute distress.     Appearance: She is well-developed. She is obese. She is not ill-appearing, toxic-appearing or diaphoretic.   HENT:      Head: Normocephalic and atraumatic.   Eyes:      Extraocular Movements: Extraocular movements intact.      Pupils: Pupils are equal, round, and reactive to light.   Cardiovascular:      Rate and Rhythm: Normal rate and regular rhythm.      Heart sounds: Normal heart sounds.   Pulmonary:      Effort: Pulmonary effort is normal.      Breath sounds: Normal breath sounds. No decreased breath sounds, wheezing, rhonchi or rales.      Comments: No chest wall rash confirmed by RN at the bedside that was doing triage.  Abdominal:      General: Bowel sounds are normal.      Palpations: Abdomen is soft.   Musculoskeletal:         General: Normal range of motion.      Cervical back: Normal range of motion and neck supple.   Skin:     General: Skin is warm.      Capillary Refill:  Capillary refill takes less than 2 seconds.   Neurological:      General: No focal deficit present.      Mental Status: She is alert and oriented to person, place, and time.   Psychiatric:         Mood and Affect: Mood normal.         Behavior: Behavior normal.         Vital Signs  ED Triage Vitals   Temperature Pulse Respirations Blood Pressure SpO2   08/14/24 0004 08/14/24 0000 08/14/24 0000 08/14/24 0000 08/14/24 0000   98.9 °F (37.2 °C) 84 20 158/74 97 %      Temp src Heart Rate Source Patient Position - Orthostatic VS BP Location FiO2 (%)   -- 08/14/24 0000 08/14/24 0000 08/14/24 0000 --    Monitor Lying Left arm       Pain Score       08/14/24 0000       No Pain           Vitals:    08/14/24 0000 08/14/24 0004 08/14/24 0015 08/14/24 0030   BP: 158/74 158/74  126/70   Pulse: 84 89 78 76   Patient Position - Orthostatic VS: Lying            Visual Acuity      ED Medications  Medications - No data to display    Diagnostic Studies  Results Reviewed       None                   XR chest 1 view portable   ED Interpretation by Javed Daily III, DO (08/14 0013)   Portable chest x-ray shows no evidence of occult pneumothoraces, osseous abnormalities or infiltrates.                 Procedures  ECG 12 Lead Documentation Only    Date/Time: 8/14/2024 12:05 AM    Performed by: Javed Daily III, DO  Authorized by: Javed Daily III, DO    Indications / Diagnosis:  Chest pain  ECG reviewed by me, the ED Provider: yes    Patient location:  ED  Comments:      I personally reviewed this EKG that was performed on the patient on August 14, 2024, EKG was completed at 12:01 AM inter by me at 12:05 AM, normal sinus rhythm with occasional sinus arrhythmia ventricular rate of 83 bpm, midportion of pulse within normal limits.    No diffuse elevations to indicate pericarditis.  No coved ST elevations greater than 2mm with negative T waves in V1-3 to indicate concern for brugada.  No biphasic T waves in V2, V3  "to indicate Wellens (critical stenosis of LAD).   No elevation in aVR or deviation when compared to V1 (can be associated with ST depression in I,II, V4-6 when left main occlusion is present).            ED Course  ED Course as of 08/14/24 0052   Wed Aug 14, 2024   0006 Patient seen evaluated, orders placed, apical chest pain, lasting less than 1 minute, EKG unremarkable, patient postpartum greater than 16 months, no calf pain, signs are unremarkable, O2 saturation is anywhere from 97 to 100%, not tachycardic, low risk PERC score.    Brief focused differential dx in this patient is as follows:  Atypical chest pain vs. Anxiety.                                               Medical Decision Making  No radiation to the back. No tearing pain going to the back. Normal bilat UE pulses.   No pleuritic pain. No history of PE. No new unilateral leg swelling.  Non exertional. No sweats. No right sided pain. No vomiting.   No fever or cough to suggest pneumonia.   No  vomiting or subcue crepitus.   Equal breath sounds.   No skin rash.   No syncope.    No new murmur to suggest symptomatic valvular stenosis/ regurg or ruptured chordae.     Vitals do not suggest tamponade.   No palpable crepitus on exam.   No recent viral syndromes to suggest Aura/Myocarditis.    Portions of the record may have been created with voice recognition software. Occasional wrong word or \"sound a like\" substitutions may have occurred due to the inherent limitations of voice recognition software. Read the chart carefully and recognize, using context, where substitutions have occurred.       Counseling: I had a detailed discussion with the patient and/or guardian regarding: the historical points, exam findings, and any diagnostic results supporting the discharge diagnosis, lab results, radiology results, discharge instructions reviewed with patient and/or family/caregiver and understanding was verbalized. Instructions given to return to the emergency " department if symptoms worsen or persist, or if there are any questions or concerns that arise at home.             Amount and/or Complexity of Data Reviewed  Radiology: ordered and independent interpretation performed.                 Disposition  Final diagnoses:   Atypical chest pain     Time reflects when diagnosis was documented in both MDM as applicable and the Disposition within this note       Time User Action Codes Description Comment    8/14/2024 12:24 AM Javed Daily Add [R07.89] Atypical chest pain           ED Disposition       ED Disposition   Discharge    Condition   Stable    Date/Time   Wed Aug 14, 2024 12:14 AM    Comment   Itzel Caldwell discharge to home/self care.                   Follow-up Information       Follow up With Specialties Details Why Contact Info    Juliette Bernard DO Family Medicine   [address] [facility] [place]  [address]  [facility] [address] [phone]              Patient's Medications   Discharge Prescriptions    No medications on file       No discharge procedures on file.    PDMP Review       None            ED Provider  Electronically Signed by             Javed Daily III, DO  08/14/24 0052

## 2024-09-30 ENCOUNTER — OFFICE VISIT (OUTPATIENT)
Dept: URGENT CARE | Facility: CLINIC | Age: 29
End: 2024-09-30
Payer: COMMERCIAL

## 2024-09-30 VITALS
HEART RATE: 97 BPM | DIASTOLIC BLOOD PRESSURE: 92 MMHG | TEMPERATURE: 98.1 F | SYSTOLIC BLOOD PRESSURE: 137 MMHG | OXYGEN SATURATION: 99 %

## 2024-09-30 DIAGNOSIS — H65.92 LEFT NON-SUPPURATIVE OTITIS MEDIA: Primary | ICD-10-CM

## 2024-09-30 PROCEDURE — 99213 OFFICE O/P EST LOW 20 MIN: CPT | Performed by: STUDENT IN AN ORGANIZED HEALTH CARE EDUCATION/TRAINING PROGRAM

## 2024-09-30 NOTE — PROGRESS NOTES
[facility]        NAME: Itzel Caldwell is a 29 y.o. female  : 1995    MRN: [id number]  DATE: 2024  TIME: 3:10 PM    Assessment and Orders   Left non-suppurative otitis media [H65.92]  1. Left non-suppurative otitis media  amoxicillin-clavulanate (AUGMENTIN) 875-125 mg per tablet            Plan and Discussion      Symptoms and exam consistent with left sided otitis media secondary to recent sinusitis. Given symptoms acutely worsened and TM is very erythematous on exam, will treat with Augmentin.      Risks and benefits discussed. Patient understands and agrees with the plan.     PATIENT INSTRUCTIONS      If tests have been performed at [facility], our office will contact you with results if changes need to be made to the care plan discussed with you at the visit.  [facility]    Follow up with PCP.     If any of the following occur, please report to your nearest ED for evaluation or call 911.   Difficultly breathing or shortness of breath  Chest pain  Acutely worsening symptoms.         Chief Complaint     Chief Complaint   Patient presents with    Earache         History of Present Illness       Significant sinus pressure and congestion for 1 week. Prone to ear infections. Has been using Sudafed.     Earache   There is pain in both ears. This is a new problem. The current episode started in the past 7 days. The problem occurs constantly. The problem has been gradually worsening. There has been no fever. Associated symptoms include coughing and headaches. Pertinent negatives include no ear discharge or vomiting. Her past medical history is significant for a chronic ear infection. There is no history of a tympanostomy tube.       Review of Systems   Review of Systems   HENT:  Positive for ear pain. Negative for ear discharge.    Respiratory:  Positive for cough.    Gastrointestinal:  Negative for vomiting.   Neurological:  Positive for  headaches.         Current Medications       Current Outpatient Medications:     amoxicillin-clavulanate (AUGMENTIN) 875-125 mg per tablet, Take 1 tablet by mouth every 12 (twelve) hours for 7 days, Disp: 14 tablet, Rfl: 0    levothyroxine (Euthyrox) 75 mcg tablet, Take 1 tablet (75 mcg total) by mouth daily, Disp: 90 tablet, Rfl: 1    Current Allergies     Allergies as of 09/30/2024    (No Known Allergies)            The following portions of the patient's history were reviewed and updated as appropriate: allergies, current medications, past family history, past medical history, past social history, past surgical history and problem list.     Past Medical History:   Diagnosis Date    ALPS (autoimmune lymphoproliferative syndrome) (HCC)     Disease of thyroid gland     Gestational diabetes     Hypothyroid     Psoriatic arthritis (HCC)        Past Surgical History:   Procedure Laterality Date    RHINOPLASTY         History reviewed. No pertinent family history.      Medications have been verified.        Objective   /92   Pulse 97   Temp 98.1 °F (36.7 °C)   LMP 09/16/2024 (Approximate) Comment: tubal ligation  SpO2 99%   Patient's last menstrual period was 09/16/2024 (approximate).       Physical Exam     Physical Exam  Constitutional:       General: She is not in acute distress.     Appearance: She is not ill-appearing or toxic-appearing.   HENT:      Head: Normocephalic and atraumatic.      Right Ear: Tympanic membrane and external ear normal.      Left Ear: Tympanic membrane is injected and erythematous.      Nose: Congestion present.      Comments: Boggy nasal turbinates     Mouth/Throat:      Pharynx: Posterior oropharyngeal erythema present.      Comments: Cobblestone appearance in posterior pharynx  Cardiovascular:      Rate and Rhythm: Normal rate and regular rhythm.   Pulmonary:      Effort: Pulmonary effort is normal. No respiratory distress.      Breath sounds: No wheezing.   Neurological:       General: No focal deficit present.      Mental Status: She is alert and oriented to person, place, and time.   Psychiatric:         Mood and Affect: Mood normal.         Behavior: Behavior normal.         Thought Content: Thought content normal.         Judgment: Judgment normal.               Radha Alanis DO

## 2025-01-21 ENCOUNTER — OFFICE VISIT (OUTPATIENT)
Dept: URGENT CARE | Facility: CLINIC | Age: 30
End: 2025-01-21
Payer: COMMERCIAL

## 2025-01-21 VITALS
TEMPERATURE: 98.6 F | HEART RATE: 93 BPM | OXYGEN SATURATION: 95 % | DIASTOLIC BLOOD PRESSURE: 92 MMHG | SYSTOLIC BLOOD PRESSURE: 157 MMHG | RESPIRATION RATE: 18 BRPM

## 2025-01-21 DIAGNOSIS — J06.9 VIRAL URI WITH COUGH: Primary | ICD-10-CM

## 2025-01-21 PROCEDURE — 99213 OFFICE O/P EST LOW 20 MIN: CPT

## 2025-01-21 PROCEDURE — 87636 SARSCOV2 & INF A&B AMP PRB: CPT

## 2025-01-21 RX ORDER — ALBUTEROL SULFATE 90 UG/1
2 INHALANT RESPIRATORY (INHALATION) EVERY 6 HOURS PRN
Qty: 8.5 G | Refills: 0 | Status: SHIPPED | OUTPATIENT
Start: 2025-01-21

## 2025-01-21 RX ORDER — BENZONATATE 100 MG/1
100 CAPSULE ORAL 3 TIMES DAILY PRN
Qty: 20 CAPSULE | Refills: 0 | Status: SHIPPED | OUTPATIENT
Start: 2025-01-21

## 2025-01-21 NOTE — PROGRESS NOTES
[facility]        NAME: Itzel Caldwell is a 29 y.o. female  : 1995    MRN: [id number]  DATE: 2025  TIME: 10:03 AM    Assessment and Plan   Viral URI with cough [J06.9]  1. Viral URI with cough  benzonatate (TESSALON PERLES) 100 mg capsule    albuterol (ProAir HFA) 90 mcg/act inhaler    Covid/Flu- Office Collect Normal        Discussed with patient's symptoms appear to be viral in nature.  COVID/influenza testing performed in office.  Recommend supportive care at home with close PCP follow-up for no improvement or worsening of symptoms.  Patient agreeable and understands current treatment plan.    Patient Instructions     Patient Instructions   You may take Tessalon three times daily as needed for cough.   May take Albuterol inhaler every 6 hours as needed for cough, shortness of breath, or wheezing.     Most colds are caused by viruses, which do not respond to antibiotics. Typically, viruses are self limiting and most people recover in 7-10 days.     While sick, make sure you get lots of rest and increase your fluid intake.   You may use OTC nasal saline spray, Flonase, and Mucinex for mild congestion.   Take Tylenol or Ibuprofen for fever, mild pain, and/or body aches.  Perform salt water gargles, drink warm tea with honey, and use throat lozenges and Chloraseptic spray for sore throat.    You can also apply warm compresses over your sinuses for any sinus pressure.     While you are sick, taking vitamins may help boost your immune system and potentially aid in recovery by supporting your body's natural defense mechanisms: Vitamin D3 2000 IU daily, Vitamin C 1000mg daily , and Multivitamin daily.    We have performed some tests on you during your visit with us. Our office will contact you with these results only if changes need to be made to the care plan previously discussed with you at your visit. [facility]. Please don't hesitate to call if you  have any questions regarding your results and/or treatment.     If your symptoms do not improve with our current treatment plan or worsen, please schedule an appointment with your PCP or proceed to the ER.             Follow up with PCP in 3-5 days.  Proceed to  ER if symptoms worsen.    Chief Complaint     Chief Complaint   Patient presents with   • Fever   • Fatigue     Started 3 days ago          History of Present Illness       29-year-old female presents to the clinic with flulike symptoms x 3 days.  Patient reports feeling feverish, chills, fatigue, ear pain, postnasal drip, runny nose, sore throat, watery eye discharge, cough, mild shortness of breath with coughing, burning with coughing, body aches and headaches.  Patient denies any recorded fevers, nausea, vomiting, diarrhea, wheezing, dizziness or headaches.  Patient reports taking OTC Tylenol severe cold and flu and Coricidin with mild relief of symptoms.        Cough  This is a new problem. The current episode started in the past 7 days. The problem has been rapidly worsening. The problem occurs every few minutes. The cough is Productive of brown sputum and productive of purulent sputum. Associated symptoms include chills, ear congestion, ear pain, a fever (unrecorded), headaches, myalgias, nasal congestion, postnasal drip, rhinorrhea, a sore throat, shortness of breath (with coughing) and sweats. Pertinent negatives include no chest pain (burning with coughing) or eye redness. The symptoms are aggravated by cold air.       Review of Systems   Review of Systems   Constitutional:  Positive for chills, fatigue and fever (unrecorded).   HENT:  Positive for ear pain, postnasal drip, rhinorrhea and sore throat.    Eyes:  Positive for discharge (watery). Negative for redness.   Respiratory:  Positive for cough and shortness of breath (with coughing).    Cardiovascular:  Negative for chest pain (burning with coughing) and palpitations.   Gastrointestinal:   Negative for diarrhea, nausea and vomiting.   Musculoskeletal:  Positive for arthralgias and myalgias.   Neurological:  Positive for headaches. Negative for dizziness and light-headedness.         Current Medications       Current Outpatient Medications:   •  albuterol (ProAir HFA) 90 mcg/act inhaler, Inhale 2 puffs every 6 (six) hours as needed for wheezing, Disp: 8.5 g, Rfl: 0  •  benzonatate (TESSALON PERLES) 100 mg capsule, Take 1 capsule (100 mg total) by mouth 3 (three) times a day as needed for cough, Disp: 20 capsule, Rfl: 0  •  levothyroxine (Euthyrox) 75 mcg tablet, Take 1 tablet (75 mcg total) by mouth daily, Disp: 90 tablet, Rfl: 1    Current Allergies     Allergies as of 01/21/2025   • (No Known Allergies)            The following portions of the patient's history were reviewed and updated as appropriate: allergies, current medications, past family history, past medical history, past social history, past surgical history and problem list.     Past Medical History:   Diagnosis Date   • ALPS (autoimmune lymphoproliferative syndrome) (HCC)    • Disease of thyroid gland    • Gestational diabetes    • Hypothyroid    • Psoriatic arthritis (HCC)        Past Surgical History:   Procedure Laterality Date   • RHINOPLASTY         History reviewed. No pertinent family history.      Medications have been verified.        Objective   /92   Pulse 93   Temp 98.6 °F (37 °C)   Resp 18   SpO2 95%        Physical Exam     Physical Exam  Vitals and nursing note reviewed.   Constitutional:       Appearance: Normal appearance.   HENT:      Head: Normocephalic and atraumatic.      Right Ear: Tympanic membrane, ear canal and external ear normal.      Left Ear: Tympanic membrane, ear canal and external ear normal.      Nose: Rhinorrhea present.      Mouth/Throat:      Pharynx: Uvula midline. Posterior oropharyngeal erythema and postnasal drip present. No uvula swelling.      Tonsils: No tonsillar exudate or tonsillar  abscesses.   Eyes:      Conjunctiva/sclera: Conjunctivae normal.   Cardiovascular:      Rate and Rhythm: Normal rate and regular rhythm.      Pulses: Normal pulses.      Heart sounds: Normal heart sounds.   Pulmonary:      Effort: Pulmonary effort is normal.      Breath sounds: Normal breath sounds. No stridor. No wheezing, rhonchi or rales.   Abdominal:      General: Bowel sounds are normal.      Palpations: Abdomen is soft.   Lymphadenopathy:      Cervical: No cervical adenopathy.   Skin:     General: Skin is warm and dry.   Neurological:      General: No focal deficit present.      Mental Status: She is alert.   Psychiatric:         Mood and Affect: Mood normal.         Behavior: Behavior normal.

## 2025-01-21 NOTE — PATIENT INSTRUCTIONS
You may take Tessalon three times daily as needed for cough.   May take Albuterol inhaler every 6 hours as needed for cough, shortness of breath, or wheezing.     Most colds are caused by viruses, which do not respond to antibiotics. Typically, viruses are self limiting and most people recover in 7-10 days.     While sick, make sure you get lots of rest and increase your fluid intake.   You may use OTC nasal saline spray, Flonase, and Mucinex for mild congestion.   Take Tylenol or Ibuprofen for fever, mild pain, and/or body aches.  Perform salt water gargles, drink warm tea with honey, and use throat lozenges and Chloraseptic spray for sore throat.    You can also apply warm compresses over your sinuses for any sinus pressure.     While you are sick, taking vitamins may help boost your immune system and potentially aid in recovery by supporting your body's natural defense mechanisms: Vitamin D3 2000 IU daily, Vitamin C 1000mg daily , and Multivitamin daily.    We have performed some tests on you during your visit with us. Our office will contact you with these results only if changes need to be made to the care plan previously discussed with you at your visit. You can review your results on [facility]. Please don't hesitate to call if you have any questions regarding your results and/or treatment.     If your symptoms do not improve with our current treatment plan or worsen, please schedule an appointment with your PCP or proceed to the ER.

## 2025-01-22 ENCOUNTER — RESULTS FOLLOW-UP (OUTPATIENT)
Dept: URGENT CARE | Facility: CLINIC | Age: 30
End: 2025-01-22

## 2025-01-22 LAB
FLUAV RNA RESP QL NAA+PROBE: POSITIVE
FLUBV RNA RESP QL NAA+PROBE: NEGATIVE
SARS-COV-2 RNA RESP QL NAA+PROBE: NEGATIVE

## 2025-01-25 ENCOUNTER — OFFICE VISIT (OUTPATIENT)
Dept: URGENT CARE | Facility: CLINIC | Age: 30
End: 2025-01-25
Payer: COMMERCIAL

## 2025-01-25 VITALS
TEMPERATURE: 98.1 F | DIASTOLIC BLOOD PRESSURE: 87 MMHG | HEART RATE: 86 BPM | WEIGHT: 275 LBS | BODY MASS INDEX: 44.2 KG/M2 | RESPIRATION RATE: 20 BRPM | OXYGEN SATURATION: 98 % | HEIGHT: 66 IN | SYSTOLIC BLOOD PRESSURE: 139 MMHG

## 2025-01-25 DIAGNOSIS — B30.9 VIRAL CONJUNCTIVITIS: Primary | ICD-10-CM

## 2025-01-25 PROCEDURE — 99213 OFFICE O/P EST LOW 20 MIN: CPT

## 2025-01-25 RX ORDER — KETOTIFEN FUMARATE 0.35 MG/ML
1 SOLUTION/ DROPS OPHTHALMIC 2 TIMES DAILY
Qty: 3 ML | Refills: 0 | Status: SHIPPED | OUTPATIENT
Start: 2025-01-25

## 2025-01-25 NOTE — PATIENT INSTRUCTIONS
Please administer Zaditor eyedrops twice daily.    May use OTC lubrication or saline drops for comfort.    May use OTC Flonase.  Please avoid using eye contacts until symptoms resolve.    If your symptoms do not improve with our current treatment plan or worsen, please schedule an appointment with your PCP or proceed to the ED.

## 2025-01-25 NOTE — PROGRESS NOTES
"  [facility]        NAME: Itzel Caldwell is a 29 y.o. female  : 1995    MRN: [id number]  DATE: 2025  TIME: 8:39 AM    Assessment and Plan   Viral conjunctivitis [B30.9]  1. Viral conjunctivitis  Ketotifen Fumarate (ZADITOR) 0.035 % ophthalmic solution        Given recent influenza and lack of discharge from eyes, suspect viral/allergic conjunctivitis.  Will treat with Zaditor twice daily for redness and itchiness.  Instructed patient to follow-up with PCP or ophthalmologist for no improvement or worsening of symptoms.      Patient Instructions     Patient Instructions   Please administer Zaditor eyedrops twice daily.    May use OTC lubrication or saline drops for comfort.    May use OTC Flonase.  Please avoid using eye contacts until symptoms resolve.    If your symptoms do not improve with our current treatment plan or worsen, please schedule an appointment with your PCP or proceed to the ED.        Follow up with PCP in 3-5 days.  Proceed to  ER if symptoms worsen.    Chief Complaint     Chief Complaint   Patient presents with   • Conjunctivitis     Right eye redness for 5days. +itching. Does wear monthly contacts and says eye feels \"better\" when they are in.          History of Present Illness       29-year-old female presents to the clinic for evaluation of eye itchiness x 1 day.  Patient reports her eye is also reddened and dry.  Patient denies any eye drainage, pain, or visual disturbances.  Patient denies any fevers, chills, chest pain, shortness of breath, nausea, vomiting, diarrhea, sinus congestion, sore throat or dizziness.  Patient reports administering an OTC lubricant eyedrop.  Of note, she does wear contacts.  She also was seen at urgent care and diagnosed with influenza on .  She reports those symptoms have resolved.            Conjunctivitis   Associated symptoms include eye itching and eye redness. Pertinent negatives include no fever, no photophobia, no diarrhea, no " nausea, no vomiting, no congestion, no ear pain, no sore throat, no eye discharge and no eye pain.       Review of Systems   Review of Systems   Constitutional:  Negative for chills and fever.   HENT:  Negative for congestion, ear pain and sore throat.    Eyes:  Positive for redness and itching. Negative for photophobia, pain, discharge and visual disturbance.   Respiratory:  Negative for shortness of breath.    Cardiovascular:  Negative for chest pain.   Gastrointestinal:  Negative for diarrhea, nausea and vomiting.   Musculoskeletal:  Negative for arthralgias and myalgias.   Neurological:  Negative for dizziness and light-headedness.         Current Medications       Current Outpatient Medications:   •  albuterol (ProAir HFA) 90 mcg/act inhaler, Inhale 2 puffs every 6 (six) hours as needed for wheezing, Disp: 8.5 g, Rfl: 0  •  benzonatate (TESSALON PERLES) 100 mg capsule, Take 1 capsule (100 mg total) by mouth 3 (three) times a day as needed for cough, Disp: 20 capsule, Rfl: 0  •  Ketotifen Fumarate (ZADITOR) 0.035 % ophthalmic solution, Administer 1 drop to the right eye 2 (two) times a day, Disp: 3 mL, Rfl: 0  •  levothyroxine (Euthyrox) 75 mcg tablet, Take 1 tablet (75 mcg total) by mouth daily, Disp: 90 tablet, Rfl: 1    Current Allergies     Allergies as of 01/25/2025   • (No Known Allergies)            The following portions of the patient's history were reviewed and updated as appropriate: allergies, current medications, past family history, past medical history, past social history, past surgical history and problem list.     Past Medical History:   Diagnosis Date   • ALPS (autoimmune lymphoproliferative syndrome) (HCC)    • Disease of thyroid gland    • Gestational diabetes    • Hypothyroid    • Psoriatic arthritis (HCC)        Past Surgical History:   Procedure Laterality Date   • RHINOPLASTY         History reviewed. No pertinent family history.      Medications have been verified.        Objective   BP  "139/87   Pulse 86   Temp 98.1 °F (36.7 °C)   Resp 20   Ht 5' 6\" (1.676 m)   Wt 125 kg (275 lb)   SpO2 98%   BMI 44.39 kg/m²        Physical Exam     Physical Exam  Vitals and nursing note reviewed.   Constitutional:       Appearance: Normal appearance.   HENT:      Head: Normocephalic and atraumatic.      Right Ear: Ear canal and external ear normal. A middle ear effusion is present.      Left Ear: Ear canal and external ear normal. A middle ear effusion is present.      Mouth/Throat:      Pharynx: No oropharyngeal exudate or posterior oropharyngeal erythema.   Eyes:      General:         Right eye: No foreign body, discharge or hordeolum.         Left eye: No foreign body, discharge or hordeolum.      Conjunctiva/sclera:      Right eye: Right conjunctiva is injected. No chemosis, exudate or hemorrhage.     Left eye: Left conjunctiva is not injected. No chemosis, exudate or hemorrhage.  Cardiovascular:      Rate and Rhythm: Normal rate and regular rhythm.      Pulses: Normal pulses.      Heart sounds: Normal heart sounds.   Pulmonary:      Effort: Pulmonary effort is normal.      Breath sounds: Normal breath sounds.   Lymphadenopathy:      Cervical: No cervical adenopathy.   Skin:     General: Skin is warm and dry.   Neurological:      General: No focal deficit present.      Mental Status: She is alert.   Psychiatric:         Mood and Affect: Mood normal.         Behavior: Behavior normal.                   "

## 2025-02-12 ENCOUNTER — OFFICE VISIT (OUTPATIENT)
Dept: FAMILY MEDICINE CLINIC | Facility: CLINIC | Age: 30
End: 2025-02-12
Payer: COMMERCIAL

## 2025-02-12 VITALS
HEART RATE: 106 BPM | OXYGEN SATURATION: 98 % | TEMPERATURE: 99 F | DIASTOLIC BLOOD PRESSURE: 86 MMHG | SYSTOLIC BLOOD PRESSURE: 134 MMHG | BODY MASS INDEX: 44.68 KG/M2 | HEIGHT: 66 IN | WEIGHT: 278 LBS

## 2025-02-12 DIAGNOSIS — E66.813 CLASS 3 SEVERE OBESITY WITH SERIOUS COMORBIDITY AND BODY MASS INDEX (BMI) OF 45.0 TO 49.9 IN ADULT, UNSPECIFIED OBESITY TYPE (HCC): ICD-10-CM

## 2025-02-12 DIAGNOSIS — L40.50 PSORIASIS WITH ARTHROPATHY (HCC): ICD-10-CM

## 2025-02-12 DIAGNOSIS — E66.01 CLASS 3 SEVERE OBESITY WITH SERIOUS COMORBIDITY AND BODY MASS INDEX (BMI) OF 45.0 TO 49.9 IN ADULT, UNSPECIFIED OBESITY TYPE (HCC): ICD-10-CM

## 2025-02-12 DIAGNOSIS — D68.61 ANTIPHOSPHOLIPID ANTIBODY SYNDROME (HCC): ICD-10-CM

## 2025-02-12 DIAGNOSIS — R06.83 SNORING: ICD-10-CM

## 2025-02-12 DIAGNOSIS — E06.3 HYPOTHYROIDISM DUE TO HASHIMOTO'S THYROIDITIS: ICD-10-CM

## 2025-02-12 DIAGNOSIS — Z76.89 ENCOUNTER TO ESTABLISH CARE: Primary | ICD-10-CM

## 2025-02-12 DIAGNOSIS — R53.83 FATIGUE, UNSPECIFIED TYPE: ICD-10-CM

## 2025-02-12 DIAGNOSIS — Z86.32 HISTORY OF GESTATIONAL DIABETES: ICD-10-CM

## 2025-02-12 PROCEDURE — 99214 OFFICE O/P EST MOD 30 MIN: CPT | Performed by: NURSE PRACTITIONER

## 2025-02-12 NOTE — PROGRESS NOTES
Name: Itzel Caldwell      : 1995      MRN: [id number]  Encounter Provider: ANURAG Webb  Encounter Date: 2025   Encounter department: [facility]  :  Assessment & Plan  Encounter to establish care         Hypothyroidism due to Hashimoto's thyroiditis    Orders:  •  TSH, 3rd generation with Free T4 reflex; Future    Fatigue, unspecified type    Orders:  •  Comprehensive metabolic panel; Future  •  Lipid Panel with Direct LDL reflex; Future  •  TSH, 3rd generation with Free T4 reflex; Future  •  CBC and differential; Future  •  Ambulatory Referral to Sleep Medicine; Future    Class 3 severe obesity with serious comorbidity and body mass index (BMI) of 45.0 to 49.9 in adult, unspecified obesity type (HCC)         History of gestational diabetes    Orders:  •  Hemoglobin A1C; Future    Antiphospholipid antibody syndrome (HCC)         Psoriasis with arthropathy (HCC)         Snoring    Orders:  •  Ambulatory Referral to Sleep Medicine; Future           History of Present Illness   Patient is here to establish care as a new patient, and also concerned about weight loss.  Doing well for the most part.  Really concerned about losing weight.  She has tried dietitian referral in the past.  She is trying to change her diet to higher vegetables low carbs.  She tries to exercise when she get fitted and with her work and raising her 2 small children.  Mood is okay.  Previous provider had noted binge eating but she does not really think that is the issue anymore.  She is interested in trying medication.  She does admit to feeling tired chronically.  She also does snore she has had rhinoplasty twice and she has never had a sleep study.  She has a history of gestational diabetes, a mild version.  And also did have an elevated blood pressure during pregnancy.  These conditions had normalized postdelivery.  She does have a history of psoriatic arthritis.      Review of Systems    Objective  "  /86 (BP Location: Left arm, Patient Position: Sitting, Cuff Size: Adult)   Pulse (!) 106   Temp 99 °F (37.2 °C) (Tympanic)   Ht 5' 5.75\" (1.67 m)   Wt 126 kg (278 lb)   SpO2 98%   BMI 45.21 kg/m²      Physical Exam  Constitutional:       Appearance: She is not ill-appearing.   Cardiovascular:      Rate and Rhythm: Normal rate and regular rhythm.   Pulmonary:      Effort: Pulmonary effort is normal.      Breath sounds: Normal breath sounds.   Abdominal:      General: Bowel sounds are normal.      Palpations: Abdomen is soft. There is no mass.      Tenderness: There is no abdominal tenderness.   Musculoskeletal:      Right lower leg: No edema.      Left lower leg: No edema.   Lymphadenopathy:      Cervical: No cervical adenopathy.   Neurological:      Mental Status: She is alert.   Psychiatric:         Mood and Affect: Mood normal.         "

## 2025-02-13 ENCOUNTER — TRANSCRIBE ORDERS (OUTPATIENT)
Dept: SLEEP CENTER | Facility: CLINIC | Age: 30
End: 2025-02-13

## 2025-02-13 ENCOUNTER — APPOINTMENT (OUTPATIENT)
Age: 30
End: 2025-02-13
Payer: COMMERCIAL

## 2025-02-13 DIAGNOSIS — R53.83 FATIGUE, UNSPECIFIED TYPE: ICD-10-CM

## 2025-02-13 DIAGNOSIS — Z86.32 HISTORY OF GESTATIONAL DIABETES: ICD-10-CM

## 2025-02-13 DIAGNOSIS — R06.83 SNORING: ICD-10-CM

## 2025-02-13 DIAGNOSIS — E06.3 HYPOTHYROIDISM DUE TO HASHIMOTO'S THYROIDITIS: ICD-10-CM

## 2025-02-13 DIAGNOSIS — R53.83 FATIGUE, UNSPECIFIED TYPE: Primary | ICD-10-CM

## 2025-02-13 LAB
ALBUMIN SERPL BCG-MCNC: 4.5 G/DL (ref 3.5–5)
ALP SERPL-CCNC: 66 U/L (ref 34–104)
ALT SERPL W P-5'-P-CCNC: 15 U/L (ref 7–52)
ANION GAP SERPL CALCULATED.3IONS-SCNC: 9 MMOL/L (ref 4–13)
AST SERPL W P-5'-P-CCNC: 15 U/L (ref 13–39)
BASOPHILS # BLD AUTO: 0.05 THOUSANDS/ÂΜL (ref 0–0.1)
BASOPHILS NFR BLD AUTO: 1 % (ref 0–1)
BILIRUB SERPL-MCNC: 0.6 MG/DL (ref 0.2–1)
BUN SERPL-MCNC: 13 MG/DL (ref 5–25)
CALCIUM SERPL-MCNC: 9.4 MG/DL (ref 8.4–10.2)
CHLORIDE SERPL-SCNC: 105 MMOL/L (ref 96–108)
CHOLEST SERPL-MCNC: 146 MG/DL (ref ?–200)
CO2 SERPL-SCNC: 23 MMOL/L (ref 21–32)
CREAT SERPL-MCNC: 0.46 MG/DL (ref 0.6–1.3)
EOSINOPHIL # BLD AUTO: 0.18 THOUSAND/ÂΜL (ref 0–0.61)
EOSINOPHIL NFR BLD AUTO: 2 % (ref 0–6)
ERYTHROCYTE [DISTWIDTH] IN BLOOD BY AUTOMATED COUNT: 12.6 % (ref 11.6–15.1)
EST. AVERAGE GLUCOSE BLD GHB EST-MCNC: 105 MG/DL
GFR SERPL CREATININE-BSD FRML MDRD: 132 ML/MIN/1.73SQ M
GLUCOSE P FAST SERPL-MCNC: 104 MG/DL (ref 65–99)
HBA1C MFR BLD: 5.3 %
HCT VFR BLD AUTO: 39.6 % (ref 34.8–46.1)
HDLC SERPL-MCNC: 42 MG/DL
HGB BLD-MCNC: 12.8 G/DL (ref 11.5–15.4)
IMM GRANULOCYTES # BLD AUTO: 0.06 THOUSAND/UL (ref 0–0.2)
IMM GRANULOCYTES NFR BLD AUTO: 1 % (ref 0–2)
LDLC SERPL CALC-MCNC: 89 MG/DL (ref 0–100)
LYMPHOCYTES # BLD AUTO: 2.27 THOUSANDS/ÂΜL (ref 0.6–4.47)
LYMPHOCYTES NFR BLD AUTO: 27 % (ref 14–44)
MCH RBC QN AUTO: 28.9 PG (ref 26.8–34.3)
MCHC RBC AUTO-ENTMCNC: 32.3 G/DL (ref 31.4–37.4)
MCV RBC AUTO: 89 FL (ref 82–98)
MONOCYTES # BLD AUTO: 0.56 THOUSAND/ÂΜL (ref 0.17–1.22)
MONOCYTES NFR BLD AUTO: 7 % (ref 4–12)
NEUTROPHILS # BLD AUTO: 5.31 THOUSANDS/ÂΜL (ref 1.85–7.62)
NEUTS SEG NFR BLD AUTO: 62 % (ref 43–75)
NRBC BLD AUTO-RTO: 0 /100 WBCS
PLATELET # BLD AUTO: 249 THOUSANDS/UL (ref 149–390)
PMV BLD AUTO: 11.1 FL (ref 8.9–12.7)
POTASSIUM SERPL-SCNC: 4.5 MMOL/L (ref 3.5–5.3)
PROT SERPL-MCNC: 7.6 G/DL (ref 6.4–8.4)
RBC # BLD AUTO: 4.43 MILLION/UL (ref 3.81–5.12)
SODIUM SERPL-SCNC: 137 MMOL/L (ref 135–147)
T4 FREE SERPL-MCNC: 0.73 NG/DL (ref 0.61–1.12)
TRIGL SERPL-MCNC: 77 MG/DL (ref ?–150)
TSH SERPL DL<=0.05 MIU/L-ACNC: 9.21 UIU/ML (ref 0.45–4.5)
WBC # BLD AUTO: 8.43 THOUSAND/UL (ref 4.31–10.16)

## 2025-02-13 PROCEDURE — 80053 COMPREHEN METABOLIC PANEL: CPT

## 2025-02-13 PROCEDURE — 85025 COMPLETE CBC W/AUTO DIFF WBC: CPT

## 2025-02-13 PROCEDURE — 84439 ASSAY OF FREE THYROXINE: CPT

## 2025-02-13 PROCEDURE — 36415 COLL VENOUS BLD VENIPUNCTURE: CPT

## 2025-02-13 PROCEDURE — 84443 ASSAY THYROID STIM HORMONE: CPT

## 2025-02-13 PROCEDURE — 80061 LIPID PANEL: CPT

## 2025-02-13 PROCEDURE — 83036 HEMOGLOBIN GLYCOSYLATED A1C: CPT

## 2025-02-14 ENCOUNTER — RESULTS FOLLOW-UP (OUTPATIENT)
Dept: FAMILY MEDICINE CLINIC | Facility: CLINIC | Age: 30
End: 2025-02-14

## 2025-02-14 DIAGNOSIS — E06.3 HYPOTHYROIDISM DUE TO HASHIMOTO'S THYROIDITIS: ICD-10-CM

## 2025-02-14 NOTE — RESULT ENCOUNTER NOTE
Notify patient that she is in a state of hypothyroidism.  I see from the chart review she is not taking her thyroid medicine.  Can you tell me how long she has been off it if it is more than 6 weeks then I would just have her restart at the dose that she was on and if she needs in a refill I can do that.  I would like her to restart it immediately and when I have her do a blood test right before she sees me in April.  We need to get this normalize before we would start any other medication

## 2025-02-17 DIAGNOSIS — E06.3 HYPOTHYROIDISM DUE TO HASHIMOTO'S THYROIDITIS: ICD-10-CM

## 2025-02-17 RX ORDER — LEVOTHYROXINE SODIUM 75 UG/1
75 TABLET ORAL DAILY
Qty: 90 TABLET | Refills: 1 | OUTPATIENT
Start: 2025-02-17

## 2025-02-17 RX ORDER — LEVOTHYROXINE SODIUM 75 UG/1
75 TABLET ORAL DAILY
Qty: 90 TABLET | Refills: 1 | Status: SHIPPED | OUTPATIENT
Start: 2025-02-17

## 2025-02-17 NOTE — TELEPHONE ENCOUNTER
Patient returned call.  Relayed provider's comments/recommendations verbatim.  Patient expressed understanding.    Patient states she has not been taking thyroid medication for well over 6 weeks.  Last dosage taken was 75 mcg, and she will resume taking immediately.    Please fill a script for levothrozine 75 mcg with [facility].    Requested Prescriptions     Pending Prescriptions Disp Refills    levothyroxine (Euthyrox) 75 mcg tablet 90 tablet 1     Sig: Take 1 tablet (75 mcg total) by mouth daily

## 2025-04-08 ENCOUNTER — OFFICE VISIT (OUTPATIENT)
Dept: FAMILY MEDICINE CLINIC | Facility: CLINIC | Age: 30
End: 2025-04-08
Payer: COMMERCIAL

## 2025-04-08 ENCOUNTER — APPOINTMENT (OUTPATIENT)
Age: 30
End: 2025-04-08
Payer: COMMERCIAL

## 2025-04-08 VITALS
SYSTOLIC BLOOD PRESSURE: 130 MMHG | BODY MASS INDEX: 42.89 KG/M2 | OXYGEN SATURATION: 98 % | DIASTOLIC BLOOD PRESSURE: 80 MMHG | HEART RATE: 69 BPM | TEMPERATURE: 98.6 F | HEIGHT: 68 IN | WEIGHT: 283 LBS

## 2025-04-08 DIAGNOSIS — E66.813 CLASS 3 SEVERE OBESITY WITH SERIOUS COMORBIDITY AND BODY MASS INDEX (BMI) OF 40.0 TO 44.9 IN ADULT, UNSPECIFIED OBESITY TYPE: ICD-10-CM

## 2025-04-08 DIAGNOSIS — E66.01 MORBID OBESITY WITH BMI OF 40.0-44.9, ADULT (HCC): ICD-10-CM

## 2025-04-08 DIAGNOSIS — Z00.00 WELL ADULT EXAM: Primary | ICD-10-CM

## 2025-04-08 DIAGNOSIS — E06.3 HYPOTHYROIDISM DUE TO HASHIMOTO'S THYROIDITIS: ICD-10-CM

## 2025-04-08 DIAGNOSIS — R03.0 ELEVATED BLOOD PRESSURE READING: ICD-10-CM

## 2025-04-08 PROCEDURE — 36415 COLL VENOUS BLD VENIPUNCTURE: CPT

## 2025-04-08 PROCEDURE — 99395 PREV VISIT EST AGE 18-39: CPT | Performed by: NURSE PRACTITIONER

## 2025-04-08 PROCEDURE — 84443 ASSAY THYROID STIM HORMONE: CPT

## 2025-04-08 PROCEDURE — 84439 ASSAY OF FREE THYROXINE: CPT

## 2025-04-08 NOTE — PROGRESS NOTES
Name: Itzel Caldwell      : 1995      MRN: [id number]  Encounter Provider: ANURAG Webb  Encounter Date: 2025   Encounter department: [facility]  :  Assessment & Plan  Well adult exam         Hypothyroidism due to Hashimoto's thyroiditis  Check labs today will adjust as necessary once that is controlled we will decide on meds  Orders:    TSH + Free T4; Future    Elevated blood pressure reading  Reading of 150-160/90 when she felt funny at home not take regularly I will have her recheck it 3 times a week for 2 weeks and send me readings       Morbid obesity with BMI of 40.0-44.9, adult (HCC)  Educated about diet and exercise, carbs protein portion control etc.  Trying to build on what she has been working with with proteins already.  She would like to try Wegovy.  If not we will consider phentermine but she needs to send me some blood pressure readings as she was right on the border of an elevated blood pressure reading.  Also would consider Contrave    When Thyroid normalized I will initiate medication.  She is interested in trying Wegovy so we will begin with that depending on insurance       Class 3 severe obesity with serious comorbidity and body mass index (BMI) of 40.0 to 44.9 in adult, unspecified obesity type ([facility])                  History of Present Illness   Is here for well visit.  [facility] it in February.  We did check some labs and she was hypothyroid so we will going to recheck that TSH and see how she is doing today.  She has not really noticed a change since she restarted her medication.  She was interested in weight loss medication.  Denies issue with mood.  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  No dedicated workout routine.  Occasionally noticed an elevated blood pressure at home but she only takes it when she is feeling funny or stressed which is occasional.  A1c was normal.   "She eats a lot of vegetables not as many fruits.  She tries to eat what she calls a protein [place] diet still does eat some carbs.  Sleeping okay lives with   and 2 kids      Review of Systems    Objective   /80 (BP Location: Right arm, Patient Position: Sitting, Cuff Size: Large)   Pulse 69   Temp 98.6 °F (37 °C) (Tympanic)   Ht 5' 7.75\" (1.721 m)   Wt 128 kg (283 lb)   LMP 03/25/2025   SpO2 98%   BMI 43.35 kg/m²      Physical Exam  Cardiovascular:      Rate and Rhythm: Normal rate and regular rhythm.      Comments: /80  Pulmonary:      Effort: Pulmonary effort is normal.      Breath sounds: Normal breath sounds.   Abdominal:      General: Abdomen is flat.      Palpations: Abdomen is soft.   Neurological:      Mental Status: She is alert.   Psychiatric:         Mood and Affect: Mood normal.         "

## 2025-04-08 NOTE — ASSESSMENT & PLAN NOTE
Check labs today will adjust as necessary once that is controlled we will decide on meds  Orders:    TSH + Free T4; Future

## 2025-04-09 DIAGNOSIS — E06.3 HYPOTHYROIDISM DUE TO HASHIMOTO'S THYROIDITIS: Primary | ICD-10-CM

## 2025-04-09 LAB
T4 FREE SERPL-MCNC: 0.82 NG/DL (ref 0.61–1.12)
TSH SERPL DL<=0.05 MIU/L-ACNC: 5.58 UIU/ML (ref 0.45–4.5)

## 2025-04-09 RX ORDER — LEVOTHYROXINE SODIUM 100 UG/1
100 TABLET ORAL
Qty: 100 TABLET | Refills: 3 | Status: SHIPPED | OUTPATIENT
Start: 2025-04-09 | End: 2025-04-10

## 2025-04-10 ENCOUNTER — RESULTS FOLLOW-UP (OUTPATIENT)
Dept: FAMILY MEDICINE CLINIC | Facility: CLINIC | Age: 30
End: 2025-04-10

## 2025-04-10 DIAGNOSIS — E66.813 CLASS 3 SEVERE OBESITY WITH SERIOUS COMORBIDITY AND BODY MASS INDEX (BMI) OF 40.0 TO 44.9 IN ADULT, UNSPECIFIED OBESITY TYPE: Primary | ICD-10-CM

## 2025-04-10 DIAGNOSIS — E06.3 HYPOTHYROIDISM DUE TO HASHIMOTO'S THYROIDITIS: ICD-10-CM

## 2025-04-10 RX ORDER — LEVOTHYROXINE SODIUM 88 UG/1
88 TABLET ORAL
Qty: 100 TABLET | Refills: 3 | Status: SHIPPED | OUTPATIENT
Start: 2025-04-10 | End: 2025-04-17

## 2025-04-10 RX ORDER — SEMAGLUTIDE 0.25 MG/.5ML
INJECTION, SOLUTION SUBCUTANEOUS
Qty: 2 ML | Refills: 0 | Status: SHIPPED | OUTPATIENT
Start: 2025-04-10

## 2025-04-10 NOTE — RESULT ENCOUNTER NOTE
Notify patient that her thyroid is not completely controlled although it is improved so I did increase her thyroid medication slightly.  We will recheck that in about 3 months.  Since it was close to normalization I did also put the order in for Wegovy and that should kick out a prior Auth process where internally they kind of apply to your insurance company for allowing us to explain why you need it and hopefully that works and if not we go to [facility]

## 2025-04-17 ENCOUNTER — OFFICE VISIT (OUTPATIENT)
Dept: FAMILY MEDICINE CLINIC | Facility: CLINIC | Age: 30
End: 2025-04-17
Payer: COMMERCIAL

## 2025-04-17 ENCOUNTER — NURSE TRIAGE (OUTPATIENT)
Age: 30
End: 2025-04-17

## 2025-04-17 VITALS
WEIGHT: 285.4 LBS | TEMPERATURE: 98.7 F | SYSTOLIC BLOOD PRESSURE: 126 MMHG | OXYGEN SATURATION: 98 % | BODY MASS INDEX: 43.72 KG/M2 | DIASTOLIC BLOOD PRESSURE: 82 MMHG | HEART RATE: 108 BPM

## 2025-04-17 DIAGNOSIS — T78.40XS ALLERGIC REACTION, SEQUELA: Primary | ICD-10-CM

## 2025-04-17 PROCEDURE — 99213 OFFICE O/P EST LOW 20 MIN: CPT | Performed by: STUDENT IN AN ORGANIZED HEALTH CARE EDUCATION/TRAINING PROGRAM

## 2025-04-17 RX ORDER — LEVOTHYROXINE SODIUM 88 UG/1
88 TABLET ORAL DAILY
Qty: 90 TABLET | Refills: 0 | Status: SHIPPED | OUTPATIENT
Start: 2025-04-17

## 2025-04-17 RX ORDER — LEVOTHYROXINE SODIUM 100 UG/1
100 TABLET ORAL DAILY
Qty: 90 TABLET | Refills: 0 | Status: SHIPPED | OUTPATIENT
Start: 2025-04-17 | End: 2025-04-17

## 2025-04-17 RX ORDER — METHYLPREDNISOLONE 4 MG/1
TABLET ORAL
Qty: 21 EACH | Refills: 0 | Status: SHIPPED | OUTPATIENT
Start: 2025-04-17

## 2025-04-17 NOTE — PROGRESS NOTES
Name: Itzel Caldwell      : 1995      MRN: [id number]  Encounter Provider: Justin Santo MD  Encounter Date: 2025   Encounter department: [facility]  :  Assessment & Plan  Allergic reaction, sequela    Orders:    methylPREDNISolone 4 MG tablet therapy pack; Use as directed on package    levothyroxine (Euthyrox) 88 mcg tablet; Take 1 tablet (88 mcg total) by mouth daily    On chart review looks that she has been on Euthyrox levothyroxine for quite some time with multiple medication changes.  Her most recent prescription for Euthyrox 88 was not Euthyrox brand and was instead generic.  I am suspicious that she may be having a allergic reaction to one of the filler products in that not on Euthyrox medication.  Will start her on a steroid taper and provide her a prescription for Euthyrox brand only levothyroxine.  She will follow-up if symptoms do not resolve     History of Present Illness   Urticaria  Pertinent negatives include no cough, eye pain, fever, shortness of breath, sore throat or vomiting.     30-year-old female presents the office with concerns of possible allergic reaction to her thyroid medicine.  Patient states her medication was recently changed from 75 mcg to 88 mcg.  States she took her dose of 88 mcg for the first time saying about an hour later developed a red itchy rash around her neck.  Patient denies any other exposures new contacts.  She has not tried any over-the-counter medication      Review of Systems   Constitutional:  Negative for chills and fever.   HENT:  Negative for ear pain and sore throat.    Eyes:  Negative for pain and visual disturbance.   Respiratory:  Negative for cough and shortness of breath.    Cardiovascular:  Negative for chest pain and palpitations.   Gastrointestinal:  Negative for abdominal pain and vomiting.   Genitourinary:  Negative for dysuria and hematuria.   Musculoskeletal:  Negative for arthralgias and back pain.   Skin:   Positive for rash. Negative for color change.   Neurological:  Negative for seizures and syncope.   All other systems reviewed and are negative.      Objective   /82 (BP Location: Left arm, Patient Position: Sitting, Cuff Size: Large)   Pulse (!) 108   Temp 98.7 °F (37.1 °C) (Tympanic)   Wt 129 kg (285 lb 6.4 oz)   LMP 03/25/2025   SpO2 98%   BMI 43.72 kg/m²      Physical Exam  Vitals and nursing note reviewed.   Constitutional:       General: She is not in acute distress.     Appearance: She is well-developed.   HENT:      Head: Normocephalic and atraumatic.   Eyes:      Conjunctiva/sclera: Conjunctivae normal.      Pupils: Pupils are equal, round, and reactive to light.   Cardiovascular:      Rate and Rhythm: Normal rate and regular rhythm.      Heart sounds: Normal heart sounds. No murmur heard.     No friction rub.   Pulmonary:      Effort: Pulmonary effort is normal.      Breath sounds: Normal breath sounds.   Abdominal:      General: Bowel sounds are normal.      Palpations: Abdomen is soft.   Musculoskeletal:         General: Normal range of motion.      Cervical back: Normal range of motion and neck supple.   Skin:     General: Skin is warm.      Capillary Refill: Capillary refill takes less than 2 seconds.      Comments: Eukaryal blanching rash around the anterior neck   Neurological:      Mental Status: She is alert and oriented to person, place, and time.      Motor: No abnormal muscle tone.      Coordination: Coordination normal.   Psychiatric:         Behavior: Behavior normal.         Thought Content: Thought content normal.

## 2025-04-17 NOTE — TELEPHONE ENCOUNTER
"FOLLOW UP: made pt an appt for today    REASON FOR CONVERSATION: Medication Reaction (Hives on neck and chest)    SYMPTOMS: hives    OTHER: Pt called in to report she has a new onset of hives, she states she took medication(levothyroxine 88 mcg tablet) 2 hours ago. But this medication she has always taken with no problems, all that has changed with her meds is the dose.     Pt denies chest pain, SOB, no difficulties breathing, no swelling or hives on her face.    Per protocol, triage nurse made pt an appt and called the office to verify it was appropriate with pt to be send. Office staff was agreeable and will make PCP aware.  PCP please further advise pt.     DISPOSITION: See Today in Office    Reason for Disposition   MODERATE-SEVERE hives persist (i.e., hives interfere with normal activities or work) and taking antihistamine (e.g., cetirizine, fexofenadine, or loratadine) > 24 hours    Answer Assessment - Initial Assessment Questions  1. APPEARANCE: \"What does the rash look like?\"       Small bumps  2. LOCATION: \"Where is the rash located?\"       neck  3. NUMBER: \"How many hives are there?\"       large  4. SIZE: \"How big are the hives?\" (e.g., inches, cm, compare to coins) \"Do they all look the same or do they vary in shape and size?\"       Small seeds  5. ONSET: \"When did the hives begin?\" (e.g., hours or days ago)       2hrs after taking medication  (levothyroxine 88 mcg tablet )     6. ITCHING: \"Does it itch?\" If Yes, ask: \"How bad is the itch?\"  (e.g., none, mild, moderate, severe)      mod  7. RECURRENT PROBLEM: \"Have you had hives before?\" If Yes, ask: \"When was the last time?\" and \"What happened that time?\"       denies  8. TRIGGERS: \"Were you exposed to any new food, plant, cosmetic product or animal just before the hives began?\"      Denies and is very unsure   9. OTHER SYMPTOMS: \"Do you have any other symptoms?\" (e.g., fever, tongue swelling, difficulty breathing, abdomen pain)      denies  10. " "PREGNANCY: \"Is there any chance you are pregnant?\" \"When was your last menstrual period?\"        Denies and last cycle was 3 weeks , pt had tubal ligation    Protocols used: Hives-Adult-[place]    "